# Patient Record
Sex: FEMALE | Race: OTHER | HISPANIC OR LATINO | Employment: PART TIME | URBAN - METROPOLITAN AREA
[De-identification: names, ages, dates, MRNs, and addresses within clinical notes are randomized per-mention and may not be internally consistent; named-entity substitution may affect disease eponyms.]

---

## 2022-10-04 ENCOUNTER — APPOINTMENT (OUTPATIENT)
Dept: RADIOLOGY | Facility: HOSPITAL | Age: 32
End: 2022-10-04
Payer: COMMERCIAL

## 2022-10-04 ENCOUNTER — HOSPITAL ENCOUNTER (EMERGENCY)
Facility: HOSPITAL | Age: 32
Discharge: HOME | End: 2022-10-04
Attending: EMERGENCY MEDICINE
Payer: COMMERCIAL

## 2022-10-04 VITALS
SYSTOLIC BLOOD PRESSURE: 149 MMHG | DIASTOLIC BLOOD PRESSURE: 78 MMHG | TEMPERATURE: 99.3 F | WEIGHT: 214.29 LBS | RESPIRATION RATE: 18 BRPM | OXYGEN SATURATION: 100 % | HEART RATE: 81 BPM

## 2022-10-04 DIAGNOSIS — R07.89 ATYPICAL CHEST PAIN: Primary | ICD-10-CM

## 2022-10-04 LAB
ANION GAP SERPL CALCULATED.3IONS-SCNC: 8 MMOL/L (ref 4–13)
BASOPHILS # BLD AUTO: 0.04 THOUSANDS/ΜL (ref 0–0.1)
BASOPHILS NFR BLD AUTO: 0 % (ref 0–1)
BUN SERPL-MCNC: 18 MG/DL (ref 5–25)
CALCIUM SERPL-MCNC: 9.2 MG/DL (ref 8.4–10.2)
CARDIAC TROPONIN I PNL SERPL HS: <2 NG/L
CHLORIDE SERPL-SCNC: 103 MMOL/L (ref 96–108)
CO2 SERPL-SCNC: 26 MMOL/L (ref 21–32)
CREAT SERPL-MCNC: 0.62 MG/DL (ref 0.6–1.3)
EOSINOPHIL # BLD AUTO: 0.2 THOUSAND/ΜL (ref 0–0.61)
EOSINOPHIL NFR BLD AUTO: 2 % (ref 0–6)
ERYTHROCYTE [DISTWIDTH] IN BLOOD BY AUTOMATED COUNT: 12.6 % (ref 11.6–15.1)
GFR SERPL CREATININE-BSD FRML MDRD: 119 ML/MIN/1.73SQ M
GLUCOSE SERPL-MCNC: 99 MG/DL (ref 65–140)
HCT VFR BLD AUTO: 36.1 % (ref 34.8–46.1)
HGB BLD-MCNC: 11.8 G/DL (ref 11.5–15.4)
IMM GRANULOCYTES # BLD AUTO: 0.06 THOUSAND/UL (ref 0–0.2)
IMM GRANULOCYTES NFR BLD AUTO: 1 % (ref 0–2)
LYMPHOCYTES # BLD AUTO: 2.34 THOUSANDS/ΜL (ref 0.6–4.47)
LYMPHOCYTES NFR BLD AUTO: 24 % (ref 14–44)
MCH RBC QN AUTO: 28.9 PG (ref 26.8–34.3)
MCHC RBC AUTO-ENTMCNC: 32.7 G/DL (ref 31.4–37.4)
MCV RBC AUTO: 88 FL (ref 82–98)
MONOCYTES # BLD AUTO: 0.66 THOUSAND/ΜL (ref 0.17–1.22)
MONOCYTES NFR BLD AUTO: 7 % (ref 4–12)
NEUTROPHILS # BLD AUTO: 6.44 THOUSANDS/ΜL (ref 1.85–7.62)
NEUTS SEG NFR BLD AUTO: 66 % (ref 43–75)
NRBC BLD AUTO-RTO: 0 /100 WBCS
PLATELET # BLD AUTO: 317 THOUSANDS/UL (ref 149–390)
PMV BLD AUTO: 9.3 FL (ref 8.9–12.7)
POTASSIUM SERPL-SCNC: 3.7 MMOL/L (ref 3.5–5.3)
RBC # BLD AUTO: 4.09 MILLION/UL (ref 3.81–5.12)
SODIUM SERPL-SCNC: 137 MMOL/L (ref 135–147)
WBC # BLD AUTO: 9.74 THOUSAND/UL (ref 4.31–10.16)

## 2022-10-04 PROCEDURE — 85025 COMPLETE CBC W/AUTO DIFF WBC: CPT | Performed by: EMERGENCY MEDICINE

## 2022-10-04 PROCEDURE — 93005 ELECTROCARDIOGRAM TRACING: CPT

## 2022-10-04 PROCEDURE — 84484 ASSAY OF TROPONIN QUANT: CPT | Performed by: EMERGENCY MEDICINE

## 2022-10-04 PROCEDURE — 71046 X-RAY EXAM CHEST 2 VIEWS: CPT

## 2022-10-04 PROCEDURE — 99285 EMERGENCY DEPT VISIT HI MDM: CPT

## 2022-10-04 PROCEDURE — 80048 BASIC METABOLIC PNL TOTAL CA: CPT | Performed by: EMERGENCY MEDICINE

## 2022-10-04 PROCEDURE — 99285 EMERGENCY DEPT VISIT HI MDM: CPT | Performed by: EMERGENCY MEDICINE

## 2022-10-04 PROCEDURE — 36415 COLL VENOUS BLD VENIPUNCTURE: CPT | Performed by: EMERGENCY MEDICINE

## 2022-10-04 NOTE — Clinical Note
Nanette Liu was seen and treated in our emergency department on 10/4/2022  No restrictions            Diagnosis:     Arcenio Browne  may return to work on return date  She may return on this date: 10/05/2022         If you have any questions or concerns, please don't hesitate to call        [address] [place] [address], DO    ______________________________           _______________          _______________  Hospital Representative                              Date                                Time

## 2022-10-05 LAB
ATRIAL RATE: 81 BPM
P AXIS: 64 DEGREES
PR INTERVAL: 152 MS
QRS AXIS: 74 DEGREES
QRSD INTERVAL: 82 MS
QT INTERVAL: 368 MS
QTC INTERVAL: 427 MS
T WAVE AXIS: 68 DEGREES
VENTRICULAR RATE: 81 BPM

## 2022-10-05 PROCEDURE — 93010 ELECTROCARDIOGRAM REPORT: CPT | Performed by: INTERNAL MEDICINE

## 2022-10-05 NOTE — ED PROVIDER NOTES
History  Chief Complaint   Patient presents with    Chest Pain     Pt presents to the ED for chest pain and palpitations since last evening  She reports last evening when chest pain started "I broke out in a sweat " She reports SOB with pain that "comes and goes"        History provided by:  Patient  Chest Pain  Chest pain location: Upper back, anterior chest   Pain quality: aching    Pain radiates to:  Does not radiate  Pain severity:  Moderate  Onset quality:  Gradual  Duration:  20 hours  Timing:  Intermittent  Progression:  Waxing and waning  Chronicity:  New  Context: no trauma    Relieved by:  Certain positions  Worsened by:  Certain positions  Ineffective treatments:  None tried  Associated symptoms: no abdominal pain, no cough, no diaphoresis, no dizziness, no fever, no headache, no nausea, no numbness, no palpitations, no shortness of breath and not vomiting        None       History reviewed  No pertinent past medical history  Past Surgical History:   Procedure Laterality Date     SECTION         History reviewed  No pertinent family history  I have reviewed and agree with the history as documented  E-Cigarette/Vaping     E-Cigarette/Vaping Substances     Social History     Tobacco Use    Smoking status: Never Smoker   Substance Use Topics    Alcohol use: Not Currently    Drug use: Not Currently       Review of Systems   Constitutional: Negative for activity change, chills, diaphoresis and fever  HENT: Negative for congestion, sinus pressure and sore throat  Eyes: Negative for pain and visual disturbance  Respiratory: Negative for cough, chest tightness, shortness of breath, wheezing and stridor  Cardiovascular: Positive for chest pain  Negative for palpitations  Gastrointestinal: Negative for abdominal distention, abdominal pain, constipation, diarrhea, nausea and vomiting  Genitourinary: Negative for dysuria and frequency     Musculoskeletal: Negative for neck pain and neck stiffness  Skin: Negative for rash  Neurological: Negative for dizziness, speech difficulty, light-headedness, numbness and headaches  Physical Exam  Physical Exam  Vitals reviewed  Constitutional:       General: She is not in acute distress  Appearance: She is well-developed  She is not diaphoretic  HENT:      Head: Normocephalic and atraumatic  Right Ear: External ear normal       Left Ear: External ear normal       Nose: Nose normal    Eyes:      General:         Right eye: No discharge  Left eye: No discharge  Pupils: Pupils are equal, round, and reactive to light  Neck:      Trachea: No tracheal deviation  Cardiovascular:      Rate and Rhythm: Normal rate and regular rhythm  Heart sounds: Normal heart sounds  No murmur heard  Pulmonary:      Effort: Pulmonary effort is normal  No respiratory distress  Breath sounds: Normal breath sounds  No stridor  Chest:      Chest wall: Tenderness ( Left upper back, patient with truncal rotation or palpation of the left paraspinal musculature fully reproduced pain) present  Abdominal:      General: There is no distension  Palpations: Abdomen is soft  Tenderness: There is no abdominal tenderness  There is no guarding or rebound  Musculoskeletal:         General: Normal range of motion  Cervical back: Normal range of motion and neck supple  Skin:     General: Skin is warm and dry  Coloration: Skin is not pale  Findings: No erythema  Neurological:      General: No focal deficit present  Mental Status: She is alert and oriented to person, place, and time           Vital Signs  ED Triage Vitals [10/04/22 2028]   Temperature Pulse Respirations Blood Pressure SpO2   99 3 °F (37 4 °C) 81 18 149/78 100 %      Temp Source Heart Rate Source Patient Position - Orthostatic VS BP Location FiO2 (%)   Oral -- Sitting Right arm --      Pain Score       6           Vitals:    10/04/22 2028   BP: 149/78   Pulse: 81   Patient Position - Orthostatic VS: Sitting         Visual Acuity      ED Medications  Medications - No data to display    Diagnostic Studies  Results Reviewed     Procedure Component Value Units Date/Time    HS Troponin 0hr (reflex protocol) [[id number]]  (Normal) Collected: 10/04/22 2052    Lab Status: Final result Specimen: Blood from Arm, Right Updated: 10/04/22 2139     hs TnI 0hr <2 ng/L     Basic metabolic panel [[id number]] Collected: 10/04/22 2052    Lab Status: Final result Specimen: Blood from Arm, Right Updated: 10/04/22 2132     Sodium 137 mmol/L      Potassium 3 7 mmol/L      Chloride 103 mmol/L      CO2 26 mmol/L      ANION GAP 8 mmol/L      BUN 18 mg/dL      Creatinine 0 62 mg/dL      Glucose 99 mg/dL      Calcium 9 2 mg/dL      eGFR 119 ml/min/1 73sq m     Narrative:      Joni guidelines for Chronic Kidney Disease (CKD):     Stage 1 with normal or high GFR (GFR > 90 mL/min/1 73 square meters)    Stage 2 Mild CKD (GFR = 60-89 mL/min/1 73 square meters)    Stage 3A Moderate CKD (GFR = 45-59 mL/min/1 73 square meters)    Stage 3B Moderate CKD (GFR = 30-44 mL/min/1 73 square meters)    Stage 4 Severe CKD (GFR = 15-29 mL/min/1 73 square meters)    Stage 5 End Stage CKD (GFR <15 mL/min/1 73 square meters)  Note: GFR calculation is accurate only with a steady state creatinine    CBC and differential [[id number]] Collected: 10/04/22 2052    Lab Status: Final result Specimen: Blood from Arm, Right Updated: 10/04/22 2057     WBC 9 74 Thousand/uL      RBC 4 09 Million/uL      Hemoglobin 11 8 g/dL      Hematocrit 36 1 %      MCV 88 fL      MCH 28 9 pg      MCHC 32 7 g/dL      RDW 12 6 %      MPV 9 3 fL      Platelets 735 Thousands/uL      nRBC 0 /100 WBCs      Neutrophils Relative 66 %      Immat GRANS % 1 %      Lymphocytes Relative 24 %      Monocytes Relative 7 %      Eosinophils Relative 2 %      Basophils Relative 0 %      Neutrophils Absolute 6 44 Thousands/µL      Immature Grans Absolute 0 06 Thousand/uL      Lymphocytes Absolute 2 34 Thousands/µL      Monocytes Absolute 0 66 Thousand/µL      Eosinophils Absolute 0 20 Thousand/µL      Basophils Absolute 0 04 Thousands/µL                  XR chest 2 views   ED Interpretation by Ovi Bird DO (10/04 2138)   No acute pathology                 Procedures  ECG 12 Lead Documentation Only    Date/Time: 10/4/2022 8:33 PM  Performed by: Ovi Bird DO  Authorized by: Ovi Bird DO     ECG reviewed by me, the ED Provider: yes    Patient location:  ED  Interpretation:     Interpretation: normal    Rate:     ECG rate:  81    ECG rate assessment: normal    Rhythm:     Rhythm: sinus rhythm    Ectopy:     Ectopy: none    QRS:     QRS axis:  Normal    QRS intervals:  Normal  Conduction:     Conduction: normal    ST segments:     ST segments:  Normal  T waves:     T waves: normal               ED Course                       PERC Rule for PE    Flowsheet Row Most Recent Value   PERC Rule for PE    Age >=50 0 Filed at: 10/04/2022 2047   HR >=100 0 Filed at: 10/04/2022 2047   O2 Sat on room air < 95% 0 Filed at: 10/04/2022 2047   History of PE or DVT 0 Filed at: 10/04/2022 2047   Recent trauma or surgery 0 Filed at: 10/04/2022 2047   Hemoptysis 0 Filed at: 10/04/2022 2047   Exogenous estrogen 0 Filed at: 10/04/2022 2047   Unilateral leg swelling 0 Filed at: 10/04/2022 2047   PERC Rule for PE Results 0 Filed at: 10/04/2022 2047                            MDM  Number of Diagnoses or Management Options  Atypical chest pain: new and requires workup  Diagnosis management comments:       Initial ED assessment:  35-year-old female presents with left upper back pain and anterior chest pain  , reproducible on examination    Initial DDx includes but is not limited to:   Musculoskeletal pain, pneumothorax, pulmonary pathology less likely cardiac etiology, patient PERC rule negative making pulmonary embolism unlikely    Initial ED plan:   Blood work, chest x-ray, if unremarkable will DC        Final ED summary/disposition:   After evaluation and workup in the emergency department, workup unremarkable, will discharge        Amount and/or Complexity of Data Reviewed  Clinical lab tests: ordered and reviewed  Tests in the radiology section of CPT®: ordered and reviewed  Review and summarize past medical records: yes  Independent visualization of images, tracings, or specimens: yes        Disposition  Final diagnoses:   Atypical chest pain     Time reflects when diagnosis was documented in both MDM as applicable and the Disposition within this note     Time User Action Codes Description Comment    10/4/2022  9:42 PM Brenda Laureano Add [R07 89] Atypical chest pain       ED Disposition     ED Disposition   Discharge    Condition   Stable    Date/Time   Tue Oct 4, 2022  9:42 PM    Comment   Fernando Ghotra discharge to home/self care  Follow-up Information     Follow up With Specialties Details Why Contact Info    Cristian Roberson MD  Call in 1 day To arrange for the next available appointment [address] [facility]  [phone]            Patient's Medications    No medications on file       No discharge procedures on file      PDMP Review     None          ED Provider  Electronically Signed by           Ovi Bird DO  10/04/22 2144

## 2022-11-09 ENCOUNTER — HOSPITAL ENCOUNTER (EMERGENCY)
Facility: HOSPITAL | Age: 32
Discharge: HOME | End: 2022-11-09
Attending: EMERGENCY MEDICINE

## 2022-11-09 VITALS
SYSTOLIC BLOOD PRESSURE: 134 MMHG | RESPIRATION RATE: 18 BRPM | DIASTOLIC BLOOD PRESSURE: 85 MMHG | OXYGEN SATURATION: 98 % | HEART RATE: 88 BPM

## 2022-11-09 DIAGNOSIS — Z77.21 EXPOSURE TO BLOOD OR BODY FLUID: Primary | ICD-10-CM

## 2022-11-09 LAB
ALT SERPL W P-5'-P-CCNC: 38 U/L (ref 12–78)
HBV SURFACE AB SER-ACNC: 166.12 MIU/ML
HBV SURFACE AG SER QL: NORMAL
HCV AB SER QL: NORMAL
HIV 1+2 AB+HIV1 P24 AG SERPL QL IA: NORMAL

## 2022-11-09 NOTE — ED ATTENDING ATTESTATION
11/9/2022  IHoney MD, saw and evaluated the patient  I have discussed the patient with the resident/non-physician practitioner and agree with the resident's/non-physician practitioner's findings, Plan of Care, and MDM as documented in the resident's/non-physician practitioner's note, except where noted  All available labs and Radiology studies were reviewed  I was present for key portions of any procedure(s) performed by the resident/non-physician practitioner and I was immediately available to provide assistance  At this point I agree with the current assessment done in the Emergency Department  I have conducted an independent evaluation of this patient a history and physical is as follows:    ED Course     Emergency Department Note- Fior Jurado 28 y o  female MRN: [id number]    Unit/Bed#: ED 06 Encounter: [id number]    Fior Jurado is a 28 y o  female who presents with   Chief Complaint   Patient presents with   • Body Fluid Exposure     Pt was splashed in face with sputum of another pt         History of Present Illness   HPI:  Fior Jurado is a 28 y o  female who presents for evaluation of:  Exposure to bodily fluids  Patient was working with a PICU patient with a tracheostomy; some sputum from the tracheostomy splashed and hit her in the eye  Patient immediately washed the area off  Patient denies any eye discomfort  Review of Systems   Constitutional: Negative for fatigue and fever  HENT: Negative for congestion and sore throat  Respiratory: Negative for cough and shortness of breath  Cardiovascular: Negative for chest pain and palpitations  Gastrointestinal: Negative for abdominal pain and nausea  Genitourinary: Negative for flank pain and frequency  Neurological: Negative for light-headedness and headaches  Psychiatric/Behavioral: Negative for dysphoric mood and hallucinations  All other systems reviewed and are negative        Historical Information   History reviewed  No pertinent past medical history  Past Surgical History:   Procedure Laterality Date   •  SECTION       Social History   Social History     Substance and Sexual Activity   Alcohol Use Not Currently     Social History     Substance and Sexual Activity   Drug Use Not Currently     Social History     Tobacco Use   Smoking Status Never Smoker   Smokeless Tobacco Not on file     Family History: History reviewed  No pertinent family history  Meds/Allergies   PTA meds:   None     No Known Allergies    Objective   First Vitals:   Blood Pressure: 134/85 (22 0034)  Pulse: 88 (22 003)  Respirations: 18 (22)  SpO2: 98 % (22)    Current Vitals:   Blood Pressure: 134/85 (22)  Pulse: 88 (22)  Respirations: 18 (22)  SpO2: 98 % (22 004)    No intake or output data in the 24 hours ending 22 0417    Invasive Devices  Report    None                 Physical Exam  Vitals and nursing note reviewed  Constitutional:       General: She is not in acute distress  Appearance: Normal appearance  She is well-developed  HENT:      Head: Normocephalic and atraumatic  Right Ear: External ear normal       Left Ear: External ear normal       Nose: Nose normal       Mouth/Throat:      Pharynx: No oropharyngeal exudate  Eyes:      Conjunctiva/sclera: Conjunctivae normal       Pupils: Pupils are equal, round, and reactive to light  Cardiovascular:      Rate and Rhythm: Normal rate and regular rhythm  Pulmonary:      Effort: Pulmonary effort is normal  No respiratory distress  Abdominal:      General: Abdomen is flat  There is no distension  Palpations: Abdomen is soft  Musculoskeletal:         General: No deformity  Normal range of motion  Cervical back: Normal range of motion and neck supple  Skin:     General: Skin is warm and dry  Capillary Refill: Capillary refill takes less than 2 seconds     Neurological: General: No focal deficit present  Mental Status: She is alert and oriented to person, place, and time  Mental status is at baseline  Coordination: Coordination normal    Psychiatric:         Mood and Affect: Mood normal          Behavior: Behavior normal          Thought Content: Thought content normal          Judgment: Judgment normal            Medical Decision Makin  Significant bodily fluid exposure:  Patient and source patient screened with exposure panel  Patient will follow-up with employee health  Recent Results (from the past 36 hour(s))   ALT    Collection Time: 22  1:17 AM   Result Value Ref Range    ALT 38 12 - 78 U/L     No orders to display         Portions of the record may have been created with voice recognition software  Occasional wrong word or "sound a like" substitutions may have occurred due to the inherent limitations of voice recognition software  Read the chart carefully and recognize, using context, where substitutions have occurred          Critical Care Time  Procedures

## 2022-11-09 NOTE — ED PROVIDER NOTES
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  Patient states that some of the sputum from the tracheostomy splashed and hit her in the eyes  States she was wearing a mask at the time  Patient immediately washed the area off  She has no complaints at this time  None       History reviewed  No pertinent past medical history  Past Surgical History:   Procedure Laterality Date   •  SECTION         History reviewed  No pertinent family history  I have reviewed and agree with the history as documented  E-Cigarette/Vaping     E-Cigarette/Vaping Substances     Social History     Tobacco Use   • Smoking status: Never Smoker   Substance Use Topics   • Alcohol use: Not Currently   • Drug use: Not Currently        Review of Systems   Constitutional: Negative for chills and fever  HENT: Negative for ear pain and sore throat  Eyes: Negative for pain and visual disturbance  Respiratory: Negative for cough and shortness of breath  Cardiovascular: Negative for chest pain and palpitations  Gastrointestinal: Negative for abdominal pain and vomiting  Genitourinary: Negative for dysuria and hematuria  Musculoskeletal: Negative for arthralgias and back pain  Skin: Negative for color change and rash  Neurological: Negative for seizures and syncope  All other systems reviewed and are negative        Physical Exam  ED Triage Vitals [22 0034]   Temp Pulse Respirations Blood Pressure SpO2   -- 88 18 134/85 98 %      Temp src Heart Rate Source Patient Position - Orthostatic VS BP Location FiO2 (%)   -- Monitor Lying Right arm --      Pain Score       No Pain             Orthostatic Vital Signs  Vitals: 11/09/22 0034   BP: 134/85   Pulse: 88   Patient Position - Orthostatic VS: Lying       Physical Exam  Vitals and nursing note reviewed  Constitutional:       General: She is not in acute distress  Appearance: Normal appearance  She is well-developed  She is not ill-appearing, toxic-appearing or diaphoretic  HENT:      Head: Normocephalic and atraumatic  Eyes:      General: No scleral icterus  Right eye: No discharge  Left eye: No discharge  Extraocular Movements: Extraocular movements intact  Conjunctiva/sclera: Conjunctivae normal       Pupils: Pupils are equal, round, and reactive to light  Cardiovascular:      Rate and Rhythm: Normal rate  Heart sounds: No murmur heard  Pulmonary:      Effort: Pulmonary effort is normal  No respiratory distress  Musculoskeletal:      Cervical back: Neck supple  Skin:     General: Skin is warm and dry  Neurological:      General: No focal deficit present  Mental Status: She is alert and oriented to person, place, and time  ED Medications  Medications - No data to display    Diagnostic Studies  Results Reviewed     Procedure Component Value Units Date/Time    ALT [[id number]]  (Normal) Collected: 11/09/22 0117    Lab Status: Final result Specimen: Blood from Arm, Right Updated: 11/09/22 0224     ALT 38 U/L     Hepatitis B surface antigen [[id number]] Collected: 11/09/22 0117    Lab Status: In process Specimen: Blood from Arm, Right Updated: 11/09/22 0127    Hepatitis C antibody [[id number]] Collected: 11/09/22 0117    Lab Status: In process Specimen: Blood from Arm, Right Updated: 11/09/22 0127    Hepatitis B surface antibody [[id number]] Collected: 11/09/22 0117    Lab Status: In process Specimen: Blood from Arm, Right Updated: 11/09/22 0127    HIV 1/2 Antigen/Antibody (4th Generation) w Aurelio Shaw [[id number]] Collected: 11/09/22 0117    Lab Status:  In process Specimen: Blood from Arm, Right Updated: 11/09/22 0127 No orders to display         Procedures  Procedures      ED Course  ED Course as of 11/09/22 0529   Wed Nov 09, 2022   0037 Blood Pressure: 134/85   0037 Pulse: 88   0037 Respirations: 18   0037 SpO2: 98 %                                       MDM  Number of Diagnoses or Management Options    49-year-old female presents to the ED after bodily fluid exposure and is here requesting for exposure panel to be obtained  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; states some of the sputum from the tracheostomy splashed and hit her in the eyes  Has no complaints at this time  Vitals stable  Exposure panel collected  Contacted provider of source patient to obtain exposure panel from source patient  Completed exposure for and provided a copy for the patient  Instructed patient to follow-up with employee health  Stable for discharge  Disposition  Final diagnoses:   Exposure to blood or body fluid     Time reflects when diagnosis was documented in both MDM as applicable and the Disposition within this note     Time User Action Codes Description Comment    11/9/2022  1:03 AM Shahriar GALLEGO Add [Z77 21] Exposure to blood or body fluid       ED Disposition     ED Disposition   Discharge    Condition   Stable    Date/Time   Wed Nov 9, 2022  1:03 AM    Comment   Bob Arizmendi discharge to home/self care  Follow-up Information     Follow up With Specialties Details Why Contact Info    Ramses Jaramillo MD    [address]  [phone]          There are no discharge medications for this patient  No discharge procedures on file  PDMP Review     None           ED Provider  Attending physically available and evaluated Bob Arizmendi I managed the patient along with the ED Attending      Electronically Signed by         Theresa Miguel MD  11/09/22 9672

## 2023-01-13 ENCOUNTER — APPOINTMENT (OUTPATIENT)
Dept: LAB | Facility: CLINIC | Age: 33
End: 2023-01-13

## 2023-01-13 DIAGNOSIS — Z02.1 PRE-EMPLOYMENT HEALTH SCREENING EXAMINATION: ICD-10-CM

## 2023-01-13 LAB
MEV IGG SER QL IA: NORMAL
MUV IGG SER QL IA: NORMAL
RUBV IGG SERPL IA-ACNC: 23.7 IU/ML
VZV IGG SER QL IA: NORMAL

## 2023-01-16 LAB
GAMMA INTERFERON BACKGROUND BLD IA-ACNC: 0.03 IU/ML
M TB IFN-G BLD-IMP: NEGATIVE
M TB IFN-G CD4+ BCKGRND COR BLD-ACNC: -0.01 IU/ML
M TB IFN-G CD4+ BCKGRND COR BLD-ACNC: -0.01 IU/ML
MITOGEN IGNF BCKGRD COR BLD-ACNC: >10 IU/ML

## 2023-04-06 ENCOUNTER — APPOINTMENT (EMERGENCY)
Dept: RADIOLOGY | Facility: HOSPITAL | Age: 33
End: 2023-04-06

## 2023-04-06 ENCOUNTER — HOSPITAL ENCOUNTER (EMERGENCY)
Facility: HOSPITAL | Age: 33
Discharge: HOME | End: 2023-04-06
Attending: EMERGENCY MEDICINE

## 2023-04-06 VITALS
DIASTOLIC BLOOD PRESSURE: 91 MMHG | HEIGHT: 62 IN | RESPIRATION RATE: 20 BRPM | WEIGHT: 216 LBS | SYSTOLIC BLOOD PRESSURE: 152 MMHG | HEART RATE: 78 BPM | BODY MASS INDEX: 39.75 KG/M2 | OXYGEN SATURATION: 99 % | TEMPERATURE: 98.4 F

## 2023-04-06 DIAGNOSIS — M54.9 MUSCULOSKELETAL BACK PAIN: ICD-10-CM

## 2023-04-06 DIAGNOSIS — R07.89 ATYPICAL CHEST PAIN: Primary | ICD-10-CM

## 2023-04-06 LAB
ALBUMIN SERPL BCP-MCNC: 4.3 G/DL (ref 3.5–5)
ALP SERPL-CCNC: 52 U/L (ref 34–104)
ALT SERPL W P-5'-P-CCNC: 18 U/L (ref 7–52)
ANION GAP SERPL CALCULATED.3IONS-SCNC: 8 MMOL/L (ref 4–13)
AST SERPL W P-5'-P-CCNC: 14 U/L (ref 13–39)
BASOPHILS # BLD AUTO: 0.05 THOUSANDS/ÂΜL (ref 0–0.1)
BASOPHILS NFR BLD AUTO: 0 % (ref 0–1)
BILIRUB SERPL-MCNC: 0.49 MG/DL (ref 0.2–1)
BUN SERPL-MCNC: 15 MG/DL (ref 5–25)
CALCIUM SERPL-MCNC: 9.2 MG/DL (ref 8.4–10.2)
CARDIAC TROPONIN I PNL SERPL HS: 2 NG/L
CHLORIDE SERPL-SCNC: 102 MMOL/L (ref 96–108)
CO2 SERPL-SCNC: 26 MMOL/L (ref 21–32)
CREAT SERPL-MCNC: 0.61 MG/DL (ref 0.6–1.3)
D DIMER PPP FEU-MCNC: <0.27 UG/ML FEU
EOSINOPHIL # BLD AUTO: 0.17 THOUSAND/ÂΜL (ref 0–0.61)
EOSINOPHIL NFR BLD AUTO: 1 % (ref 0–6)
ERYTHROCYTE [DISTWIDTH] IN BLOOD BY AUTOMATED COUNT: 12.5 % (ref 11.6–15.1)
EXT PREGNANCY TEST URINE: NEGATIVE
EXT. CONTROL: NORMAL
GFR SERPL CREATININE-BSD FRML MDRD: 119 ML/MIN/1.73SQ M
GLUCOSE SERPL-MCNC: 118 MG/DL (ref 65–140)
HCT VFR BLD AUTO: 36.9 % (ref 34.8–46.1)
HGB BLD-MCNC: 12.2 G/DL (ref 11.5–15.4)
IMM GRANULOCYTES # BLD AUTO: 0.04 THOUSAND/UL (ref 0–0.2)
IMM GRANULOCYTES NFR BLD AUTO: 0 % (ref 0–2)
LYMPHOCYTES # BLD AUTO: 2.2 THOUSANDS/ÂΜL (ref 0.6–4.47)
LYMPHOCYTES NFR BLD AUTO: 19 % (ref 14–44)
MCH RBC QN AUTO: 29 PG (ref 26.8–34.3)
MCHC RBC AUTO-ENTMCNC: 33.1 G/DL (ref 31.4–37.4)
MCV RBC AUTO: 88 FL (ref 82–98)
MONOCYTES # BLD AUTO: 0.64 THOUSAND/ÂΜL (ref 0.17–1.22)
MONOCYTES NFR BLD AUTO: 6 % (ref 4–12)
NEUTROPHILS # BLD AUTO: 8.64 THOUSANDS/ÂΜL (ref 1.85–7.62)
NEUTS SEG NFR BLD AUTO: 74 % (ref 43–75)
NRBC BLD AUTO-RTO: 0 /100 WBCS
PLATELET # BLD AUTO: 318 THOUSANDS/UL (ref 149–390)
PMV BLD AUTO: 9.7 FL (ref 8.9–12.7)
POTASSIUM SERPL-SCNC: 3.7 MMOL/L (ref 3.5–5.3)
PROT SERPL-MCNC: 7.8 G/DL (ref 6.4–8.4)
RBC # BLD AUTO: 4.2 MILLION/UL (ref 3.81–5.12)
SODIUM SERPL-SCNC: 136 MMOL/L (ref 135–147)
WBC # BLD AUTO: 11.74 THOUSAND/UL (ref 4.31–10.16)

## 2023-04-06 RX ORDER — KETOROLAC TROMETHAMINE 30 MG/ML
15 INJECTION, SOLUTION INTRAMUSCULAR; INTRAVENOUS ONCE
Status: COMPLETED | OUTPATIENT
Start: 2023-04-06 | End: 2023-04-06

## 2023-04-06 RX ORDER — LIDOCAINE 50 MG/G
1 PATCH TOPICAL ONCE
Status: DISCONTINUED | OUTPATIENT
Start: 2023-04-06 | End: 2023-04-07 | Stop reason: HOSPADM

## 2023-04-06 RX ORDER — LIDOCAINE 50 MG/G
1 PATCH TOPICAL DAILY
Qty: 5 PATCH | Refills: 0 | Status: SHIPPED | OUTPATIENT
Start: 2023-04-06

## 2023-04-06 RX ADMIN — LIDOCAINE 1 PATCH: 50 PATCH CUTANEOUS at 21:31

## 2023-04-06 RX ADMIN — KETOROLAC TROMETHAMINE 15 MG: 30 INJECTION, SOLUTION INTRAMUSCULAR; INTRAVENOUS at 21:37

## 2023-04-07 LAB
ATRIAL RATE: 73 BPM
P AXIS: 43 DEGREES
PR INTERVAL: 140 MS
QRS AXIS: 80 DEGREES
QRSD INTERVAL: 86 MS
QT INTERVAL: 390 MS
QTC INTERVAL: 429 MS
T WAVE AXIS: 68 DEGREES
VENTRICULAR RATE: 73 BPM

## 2023-04-07 NOTE — ED PROVIDER NOTES
"History  Chief Complaint   Patient presents with   • Chest Pain     Pt c/o left sided chest tightness that radiates to her left back, pt states \"it just keeps squeezing and squeezing me\", pt reports pain increases with deep breathing     Patient is a 75-year-old female past medical history of  presenting to the emergency department for evaluation of chest pain  Patient reports she has had pain in her left scapula for the past 2 weeks  Patient reports she did follow-up with her PCP and was given prednisone  Reports for the past few days she began having left-sided chest tightness  Patient reports it feels as though there is a muscle being pulled in her chest that comes and goes  Patient reports it is worse when she tries to lay flat  Patient denies any alleviation of the pain and reports it goes away on its own  Patient denies pain on initial presentation  Patient reports having to take a deep breath when the pain comes on  In contrast with triage note patient denies any worsening pain with deep inspiration  Denies fevers, chills, rash, headache, weakness, dizziness, visual changes, abdominal pain, nausea, vomiting, diarrhea, constipation, shortness of breath or difficulty breathing  Does not offer any other concerns or complaints  None       History reviewed  No pertinent past medical history  Past Surgical History:   Procedure Laterality Date   •  SECTION         History reviewed  No pertinent family history  I have reviewed and agree with the history as documented  E-Cigarette/Vaping     E-Cigarette/Vaping Substances     Social History     Tobacco Use   • Smoking status: Never   Substance Use Topics   • Alcohol use: Not Currently   • Drug use: Not Currently       Review of Systems   Constitutional: Negative for chills and fever  HENT: Negative for ear pain and sore throat  Eyes: Negative for pain and visual disturbance  Respiratory: Positive for chest tightness   " Negative for cough and shortness of breath  Cardiovascular: Positive for chest pain  Negative for palpitations  Gastrointestinal: Negative for abdominal pain, constipation, diarrhea, nausea and vomiting  Genitourinary: Negative for dysuria and hematuria  Musculoskeletal: Negative for arthralgias and back pain  Left scapular pain   Skin: Negative for color change and rash  Neurological: Negative for dizziness, seizures, syncope, weakness, light-headedness and headaches  All other systems reviewed and are negative  Physical Exam  Physical Exam  Vitals and nursing note reviewed  Constitutional:       General: She is not in acute distress  Appearance: Normal appearance  She is well-developed  She is not ill-appearing, toxic-appearing or diaphoretic  HENT:      Head: Normocephalic and atraumatic  Right Ear: External ear normal       Left Ear: External ear normal       Nose: Nose normal       Mouth/Throat:      Mouth: Mucous membranes are moist    Eyes:      General: No scleral icterus  Right eye: No discharge  Left eye: No discharge  Conjunctiva/sclera: Conjunctivae normal    Cardiovascular:      Rate and Rhythm: Normal rate and regular rhythm  Heart sounds: No murmur heard  Pulmonary:      Effort: Pulmonary effort is normal  No respiratory distress  Breath sounds: Normal breath sounds  No decreased breath sounds, wheezing, rhonchi or rales  Abdominal:      Palpations: Abdomen is soft  Tenderness: There is no abdominal tenderness  Musculoskeletal:         General: No swelling, deformity or signs of injury  Normal range of motion  Arms:       Cervical back: Normal range of motion and neck supple  No rigidity  Skin:     General: Skin is warm and dry  Capillary Refill: Capillary refill takes less than 2 seconds  Coloration: Skin is not jaundiced  Findings: No erythema or rash     Neurological:      General: No focal deficit present  Mental Status: She is alert and oriented to person, place, and time  Mental status is at baseline  Cranial Nerves: No cranial nerve deficit  Gait: Gait normal    Psychiatric:         Mood and Affect: Mood normal          Behavior: Behavior normal          Thought Content:  Thought content normal          Judgment: Judgment normal          Vital Signs  ED Triage Vitals [04/06/23 2106]   Temperature Pulse Respirations Blood Pressure SpO2   98 4 °F (36 9 °C) 78 20 152/91 99 %      Temp Source Heart Rate Source Patient Position - Orthostatic VS BP Location FiO2 (%)   Oral Monitor Sitting Left arm --      Pain Score       --           Vitals:    04/06/23 2106   BP: 152/91   Pulse: 78   Patient Position - Orthostatic VS: Sitting         Visual Acuity      ED Medications  Medications   lidocaine (LIDODERM) 5 % patch 1 patch (1 patch Topical Medication Applied 4/6/23 2131)   ketorolac (TORADOL) injection 15 mg (15 mg Intravenous Given 4/6/23 2137)       Diagnostic Studies  Results Reviewed     Procedure Component Value Units Date/Time    HS Troponin 0hr (reflex protocol) [[id number]]  (Normal) Collected: 04/06/23 2131    Lab Status: Final result Specimen: Blood from Arm, Left Updated: 04/06/23 2157     hs TnI 0hr 2 ng/L     Comprehensive metabolic panel [[id number]] Collected: 04/06/23 2131    Lab Status: Final result Specimen: Blood from Arm, Left Updated: 04/06/23 2154     Sodium 136 mmol/L      Potassium 3 7 mmol/L      Chloride 102 mmol/L      CO2 26 mmol/L      ANION GAP 8 mmol/L      BUN 15 mg/dL      Creatinine 0 61 mg/dL      Glucose 118 mg/dL      Calcium 9 2 mg/dL      AST 14 U/L      ALT 18 U/L      Alkaline Phosphatase 52 U/L      Total Protein 7 8 g/dL      Albumin 4 3 g/dL      Total Bilirubin 0 49 mg/dL      eGFR 119 ml/min/1 73sq m     Narrative:      [place] guidelines for Chronic Kidney Disease (CKD):   •  Stage 1 with normal or high GFR (GFR > 90 mL/min/1 73 square meters)  •  Stage 2 Mild CKD (GFR = 60-89 mL/min/1 73 square meters)  •  Stage 3A Moderate CKD (GFR = 45-59 mL/min/1 73 square meters)  •  Stage 3B Moderate CKD (GFR = 30-44 mL/min/1 73 square meters)  •  Stage 4 Severe CKD (GFR = 15-29 mL/min/1 73 square meters)  •  Stage 5 End Stage CKD (GFR <15 mL/min/1 73 square meters)  Note: GFR calculation is accurate only with a steady state creatinine    D-Dimer [[id number]]  (Normal) Collected: 04/06/23 2131    Lab Status: Final result Specimen: Blood from Arm, Left Updated: 04/06/23 2151     D-Dimer, Quant <0 27 ug/ml FEU     CBC and differential [[id number]]  (Abnormal) Collected: 04/06/23 2131    Lab Status: Final result Specimen: Blood from Arm, Left Updated: 04/06/23 2138     WBC 11 74 Thousand/uL      RBC 4 20 Million/uL      Hemoglobin 12 2 g/dL      Hematocrit 36 9 %      MCV 88 fL      MCH 29 0 pg      MCHC 33 1 g/dL      RDW 12 5 %      MPV 9 7 fL      Platelets 471 Thousands/uL      nRBC 0 /100 WBCs      Neutrophils Relative 74 %      Immat GRANS % 0 %      Lymphocytes Relative 19 %      Monocytes Relative 6 %      Eosinophils Relative 1 %      Basophils Relative 0 %      Neutrophils Absolute 8 64 Thousands/µL      Immature Grans Absolute 0 04 Thousand/uL      Lymphocytes Absolute 2 20 Thousands/µL      Monocytes Absolute 0 64 Thousand/µL      Eosinophils Absolute 0 17 Thousand/µL      Basophils Absolute 0 05 Thousands/µL     POCT pregnancy, urine [[id number]]  (Normal) Resulted: 04/06/23 2135    Lab Status: Final result Updated: 04/06/23 2135     EXT Preg Test, Ur Negative     Control Valid                 XR chest 2 views    (Results Pending)              Procedures  ECG 12 Lead Documentation Only    Date/Time: 4/6/2023 9:15 PM  Performed by: Justino Carver PA-C  Authorized by: Justino Carver PA-C     Indications / Diagnosis:  Chest Pain  ECG reviewed by me, the ED Provider: yes    Patient location:  ED  Previous ECG:     Previous ECG: Compared to current    Comparison ECG info:  10/04/22  Interpretation:     Interpretation: normal    Rate:     ECG rate:  73    ECG rate assessment: normal    Rhythm:     Rhythm: sinus rhythm    Ectopy:     Ectopy: none    QRS:     QRS axis:  Normal    QRS intervals:  Normal  Conduction:     Conduction: normal    ST segments:     ST segments:  Normal  T waves:     T waves: normal               ED Course             HEART Risk Score    Flowsheet Row Most Recent Value   Heart Score Risk Calculator    History 0 Filed at: 04/06/2023 2110   ECG 0 Filed at: 04/06/2023 2110   Age 0 Filed at: 04/06/2023 2110   Risk Factors 1 Filed at: 04/06/2023 2110   Troponin 0 Filed at: 04/06/2023 2110   HEART Score 1 Filed at: 04/06/2023 2110                        SBIRT 20yo+    Flowsheet Row Most Recent Value   SBIRT (23 yo +)    In order to provide better care to our patients, we are screening all of our patients for alcohol and drug use  Would it be okay to ask you these screening questions? Yes Filed at: 04/06/2023 2110   Initial Alcohol Screen: US AUDIT-C     1  How often do you have a drink containing alcohol? 1 Filed at: 04/06/2023 2110   2  How many drinks containing alcohol do you have on a typical day you are drinking? 1 Filed at: 04/06/2023 2110   3b  FEMALE Any Age, or MALE 65+: How often do you have 4 or more drinks on one occassion? 0 Filed at: 04/06/2023 2110   Audit-C Score 2 Filed at: 04/06/2023 2110   LISSET: How many times in the past year have you    Used an illegal drug or used a prescription medication for non-medical reasons? Never Filed at: 04/06/2023 2110                    Medical Decision Making    This is a 29-year-old female presenting to the emergency department for evaluation of chest pain  Patient reports for the past few days she has had left-sided chest pain that feels like a tight muscle  Patient reports for the past 2 weeks she has had left scapular pain that comes and goes    Patient reports she was seen by her PCP and given prednisone  Patient denies any relief with the prednisone  Patient reports the pain is worse when lying flat which has resorted her to laying sitting up  Patient denies any other aggravating or alleviating factors  Patient reports she tried Tylenol Motrin at home with slight relief but the pain comes back  Patient is well-appearing with stable vital signs on initial examination  Differential diagnosis to include but is not limited to: ACS, STEMI, Pneumothorax, Pneumonia, Pleural effusion, Pericarditis, Pericardial effusion, Chest wall pain/costochondritis, Esophageal spasm, Pulmonary embolism, Bronchitis/bronchospasm, muscular strain    Initial ED Plan: CBC, CMP, troponin, D-dimer, chest x-ray, EKG    ED results: Xray images chest independently visualized and interpreted by me - no acute cardiopulmonary disease  0 hour troponin: 2  Heart score: 1    Final ED assessment: Patient is stable and well appearing  Discussed radiologic studies and laboratory results  Discussed follow-up with PCP  Discussed Lidoderm patches as needed along with Tylenol/Motrin  Strict return precautions were discussed including but not limited to chest pain, shortness of breath, difficulty breathing, worsening pain  Patient verbalized understanding and is agreeable with the plan for discharge  Amount and/or Complexity of Data Reviewed  Labs: ordered  Radiology: ordered  Risk  Prescription drug management            Disposition  Final diagnoses:   Atypical chest pain   Musculoskeletal back pain     Time reflects when diagnosis was documented in both MDM as applicable and the Disposition within this note     Time User Action Codes Description Comment    4/6/2023 10:50 PM Yeison Marquez Add [R07 89] Atypical chest pain     4/6/2023 10:50 PM Yeison Marquez Add [M54 9] Musculoskeletal back pain       ED Disposition     ED Disposition   Discharge    Condition   Stable    Date/Time   Thu Apr 6, 2023 10:50 PM    Comment   Sarwat Foley discharge to home/self care  Follow-up Information     Follow up With Specialties Details Why Contact Info Additional Information    Sheyla Velasco MD  Call in 3 days For follow up [address]        [address] [facility] Emergency Department Emergency Medicine Go to  If symptoms worsen [address] Emergency Department, [address] [place] [address], [address] [place] [address] [facility] Cardiology Call in 3 days For follow up [place] [id number]  [address] [facility], [place], [facility], [place], [address] [phone]          Patient's Medications   Discharge Prescriptions    LIDOCAINE (LIDODERM) 5 %    Apply 1 patch topically over 12 hours daily Remove & Discard patch within 12 hours or as directed by MD       Start Date: 4/6/2023  End Date: --       Order Dose: 1 patch       Quantity: 5 patch    Refills: 0       No discharge procedures on file      PDMP Review     None          ED Provider  Electronically Signed by           Tiffany Schaefer PA-C  04/06/23 8567

## 2023-04-07 NOTE — DISCHARGE INSTRUCTIONS
Follow up with PCP  Tylenol/motrin as needed  Lidoderm patches as needed  Return to the ED with new or worsening symptoms including but not limited to chest pain, shortness of breath, difficulty breathing, worsening pain

## 2023-09-01 ENCOUNTER — HOSPITAL ENCOUNTER (OUTPATIENT)
Dept: RADIOLOGY | Facility: HOSPITAL | Age: 33
End: 2023-09-01
Payer: COMMERCIAL

## 2023-09-01 DIAGNOSIS — M54.9 DORSALGIA: ICD-10-CM

## 2023-09-01 PROCEDURE — 72110 X-RAY EXAM L-2 SPINE 4/>VWS: CPT

## 2023-09-01 PROCEDURE — 72072 X-RAY EXAM THORAC SPINE 3VWS: CPT

## 2024-03-21 ENCOUNTER — HOSPITAL ENCOUNTER (EMERGENCY)
Facility: HOSPITAL | Age: 34
Discharge: HOME | End: 2024-03-21
Attending: EMERGENCY MEDICINE
Payer: COMMERCIAL

## 2024-03-21 ENCOUNTER — APPOINTMENT (EMERGENCY)
Dept: RADIOLOGY | Facility: HOSPITAL | Age: 34
End: 2024-03-21
Payer: COMMERCIAL

## 2024-03-21 VITALS
SYSTOLIC BLOOD PRESSURE: 153 MMHG | WEIGHT: 220 LBS | OXYGEN SATURATION: 100 % | HEART RATE: 88 BPM | TEMPERATURE: 98 F | BODY MASS INDEX: 41.54 KG/M2 | HEIGHT: 61 IN | DIASTOLIC BLOOD PRESSURE: 88 MMHG | RESPIRATION RATE: 18 BRPM

## 2024-03-21 DIAGNOSIS — S82.832A CLOSED FRACTURE OF PROXIMAL END OF LEFT FIBULA, UNSPECIFIED FRACTURE MORPHOLOGY, INITIAL ENCOUNTER: Primary | ICD-10-CM

## 2024-03-21 DIAGNOSIS — R20.2 PARESTHESIA OF LEFT LEG: ICD-10-CM

## 2024-03-21 PROCEDURE — 99283 EMERGENCY DEPT VISIT LOW MDM: CPT

## 2024-03-21 PROCEDURE — 73564 X-RAY EXAM KNEE 4 OR MORE: CPT

## 2024-03-21 PROCEDURE — 73590 X-RAY EXAM OF LOWER LEG: CPT

## 2024-03-21 PROCEDURE — 99284 EMERGENCY DEPT VISIT MOD MDM: CPT | Performed by: EMERGENCY MEDICINE

## 2024-03-21 RX ORDER — ACETAMINOPHEN 325 MG/1
650 TABLET ORAL EVERY 6 HOURS PRN
Qty: 40 TABLET | Refills: 0 | Status: SHIPPED | OUTPATIENT
Start: 2024-03-21

## 2024-03-21 RX ORDER — IBUPROFEN 600 MG/1
600 TABLET ORAL EVERY 8 HOURS PRN
Qty: 20 TABLET | Refills: 0 | Status: SHIPPED | OUTPATIENT
Start: 2024-03-21

## 2024-03-21 RX ORDER — ACETAMINOPHEN 325 MG/1
975 TABLET ORAL ONCE
Status: COMPLETED | OUTPATIENT
Start: 2024-03-21 | End: 2024-03-21

## 2024-03-21 RX ORDER — IBUPROFEN 600 MG/1
600 TABLET ORAL ONCE
Status: COMPLETED | OUTPATIENT
Start: 2024-03-21 | End: 2024-03-21

## 2024-03-21 RX ADMIN — IBUPROFEN 600 MG: 600 TABLET, FILM COATED ORAL at 16:02

## 2024-03-21 RX ADMIN — ACETAMINOPHEN 975 MG: 325 TABLET, FILM COATED ORAL at 16:02

## 2024-03-21 NOTE — Clinical Note
Juan Brown was seen and treated in our emergency department on 3/21/2024.                Diagnosis: Left leg fracture    Juan kennedy return to work on return date.    She may return on this date: 03/25/2024    Please allow Ms. Brown to use the knee immobilizer and crutches until cleared by an orthopedic surgeon     If you have any questions or concerns, please don't hesitate to call.      Arya Sanchez MD    ______________________________           _______________          _______________  Hospital Representative                              Date                                Time

## 2024-03-21 NOTE — DISCHARGE INSTRUCTIONS
As we discussed, the tingling in your foot is likely due to irritation or injury to the peroneal nerve.  If you develop any weakness in the foot, return immediately to the emergency department for evaluation

## 2024-03-22 NOTE — ED PROVIDER NOTES
Pt Name: Juan Brown  MRN: [id number]  Birthdate 1990  Age/Sex: 34 y.o. female  Date of evaluation: 3/21/2024  PCP: Ayana Aden MD    CHIEF COMPLAINT    Chief Complaint   Patient presents with    Knee Pain     Patient states she slipped & fell, extending her leg forward. C/o burning pain in knee to foot          HPI    34 y.o. female presenting with left knee and leg pain.  Patient states that she slipped on a slippery floor shortly prior to arrival, describes a hyperextension of her left leg, noted immediate pain and inability to walk.  The pain is severe, dull, burning, going from the left side of the knee radiating down towards the foot, with some tingling in the lateral aspect of the foot but no numbness or weakness.  She denies any other pain or injuries.      HPI      Past Medical and Surgical History    History reviewed. No pertinent past medical history.    Past Surgical History:   Procedure Laterality Date     SECTION         History reviewed. No pertinent family history.    Social History     Tobacco Use    Smoking status: Never   Vaping Use    Vaping status: Never Used   Substance Use Topics    Alcohol use: Not Currently    Drug use: Not Currently           Allergies    No Known Allergies    Home Medications    Prior to Admission medications    Medication Sig Start Date End Date Taking? Authorizing Provider   acetaminophen (TYLENOL) 325 mg tablet Take 2 tablets (650 mg total) by mouth every 6 (six) hours as needed for mild pain 3/21/24  Yes Arya Sanchez MD   ibuprofen (MOTRIN) 600 mg tablet Take 1 tablet (600 mg total) by mouth every 8 (eight) hours as needed for moderate pain 3/21/24  Yes Arya Sanchez MD   lidocaine (Lidoderm) 5 % Apply 1 patch topically over 12 hours daily Remove & Discard patch within 12 hours or as directed by MD 23   Lashawn Kowalski PA-C           Review of Systems    Review of Systems   Constitutional:  Negative for activity change, chills and  fever.   HENT:  Negative for drooling and facial swelling.    Eyes:  Negative for pain, discharge and visual disturbance.   Respiratory:  Negative for apnea, cough, chest tightness, shortness of breath and wheezing.    Cardiovascular:  Negative for chest pain and leg swelling.   Gastrointestinal:  Negative for abdominal pain, constipation, diarrhea, nausea and vomiting.   Genitourinary:  Negative for difficulty urinating, dysuria and urgency.   Musculoskeletal:  Positive for arthralgias and gait problem. Negative for back pain.   Skin:  Negative for color change and rash.   Neurological:  Negative for dizziness, speech difficulty, weakness and headaches.   Psychiatric/Behavioral:  Negative for agitation, behavioral problems and confusion.            All other systems reviewed and negative.    Physical Exam      ED Triage Vitals   Temperature Pulse Respirations Blood Pressure SpO2   03/21/24 1523 03/21/24 1523 03/21/24 1523 03/21/24 1523 03/21/24 1523   98 °F (36.7 °C) 88 18 153/88 100 %      Temp Source Heart Rate Source Patient Position - Orthostatic VS BP Location FiO2 (%)   03/21/24 1523 03/21/24 1523 03/21/24 1523 03/21/24 1523 --   Oral Monitor Sitting Left arm       Pain Score       03/21/24 1602       10 - Worst Possible Pain               Physical Exam  Vitals and nursing note reviewed.   Constitutional:       General: She is not in acute distress.     Appearance: She is well-developed. She is not ill-appearing, toxic-appearing or diaphoretic.   HENT:      Head: Normocephalic and atraumatic.      Right Ear: External ear normal.      Left Ear: External ear normal.      Nose: Nose normal. No congestion or rhinorrhea.      Mouth/Throat:      Mouth: Mucous membranes are moist.      Pharynx: Oropharynx is clear. No oropharyngeal exudate or posterior oropharyngeal erythema.   Eyes:      Conjunctiva/sclera: Conjunctivae normal.      Pupils: Pupils are equal, round, and reactive to light.   Cardiovascular:      Rate  and Rhythm: Normal rate and regular rhythm.      Pulses: Normal pulses.      Heart sounds: Normal heart sounds. No murmur heard.     No friction rub. No gallop.   Pulmonary:      Effort: Pulmonary effort is normal. No respiratory distress.      Breath sounds: Normal breath sounds. No wheezing or rales.   Abdominal:      General: There is no distension.      Palpations: Abdomen is soft.      Tenderness: There is no abdominal tenderness. There is no guarding or rebound.   Musculoskeletal:         General: Swelling and tenderness present. No deformity. Normal range of motion.      Cervical back: Normal range of motion and neck supple.      Right lower leg: No edema.      Left lower leg: No edema.      Comments: Left knee slightly swollen, tender to palpation along the lateral aspect overlying the proximal fibula, significant pain but no instability with varus stress, no pain or instability valgus stress or anterior drawer.  Strength sensation pulse cap refill intact distal, patient is complaining of paresthesias across the foot but sensation and strength intact.   Skin:     General: Skin is warm and dry.      Capillary Refill: Capillary refill takes less than 2 seconds.      Findings: No erythema or rash.   Neurological:      Mental Status: She is alert and oriented to person, place, and time.   Psychiatric:         Behavior: Behavior normal.         Thought Content: Thought content normal.         Judgment: Judgment normal.              Diagnostic Results      Labs:    Results Reviewed       None            All labs reviewed and utilized in the medical decision making process    Radiology:    XR knee 4+ vw left injury   ED Interpretation   Proximal fibula fracture, possibly secondary to avulsion      XR tibia fibula 2 views LEFT   ED Interpretation   Proximal fibula fracture, possibly secondary to avulsion            All radiology studies independently viewed by me and interpreted by the  radiologist.    Procedure    Procedures        ED Course of Care and Re-Assessments      Pain improved with Tylenol ibuprofen, placed in knee immobilizer by tech, appropriately placed and neurovascularly intact on my examination afterwards.  Given crutches.    Medications   acetaminophen (TYLENOL) tablet 975 mg (975 mg Oral Given 3/21/24 1602)   ibuprofen (MOTRIN) tablet 600 mg (600 mg Oral Given 3/21/24 1602)           FINAL IMPRESSION    Final diagnoses:   Closed fracture of proximal end of left fibula, unspecified fracture morphology, initial encounter   Paresthesia of left leg         DISPOSITION/PLAN    Presentation as above with closed fracture of the proximal fibula, suspected be associated with avulsion of tendon or ligament, possibly the LCL based on pain reproduced with varus stress.  No evidence of compartment syndrome or septic arthritis at this time.  Paresthesias concerning for possible compression or involvement of the peroneal nerve but no motor weakness or sensory loss at this time.  Patient counseled regarding findings, treat symptomatically, immobilized, given crutches, discharged with strict return precautions, follow-up with orthopedics.  Time reflects when diagnosis was documented in both MDM as applicable and the Disposition within this note       Time User Action Codes Description Comment    3/21/2024  5:16 PM Arya Sanchez Add [S82.832A] Closed fracture of proximal end of left fibula, unspecified fracture morphology, initial encounter     3/21/2024  5:17 PM Arya Sanchez Add [R20.2] Paresthesia     3/21/2024  5:17 PM Arya Sanchez Add [R20.2] Paresthesia of left leg     3/21/2024  5:17 PM Arya Sanchez Remove [R20.2] Paresthesia           ED Disposition       ED Disposition   Discharge    Condition   Stable    Date/Time   Thu Mar 21, 2024  5:15 PM    Comment   Juan Brown discharge to home/self care.                   Follow-up Information       Follow up With  Specialties Details Why Contact Info Additional Information    [facility] Emergency Department Emergency Medicine Go to  To discuss this visit and schedule close follow-up [address] [facility] [id number] [phone] [facility] Emergency Department, [address] [place], [place], [address]    Ayana Aden MD  Call in 1 day To discuss this visit and schedule close follow-up [address] [facility] [address]  [facility] [address]  [phone]       [facility] [place] [facility] Call in 1 day To discuss this visit and schedule close follow-up [address] [facility] [address]  [facility] [address]  [phone] [facility] [place], [address] [facility] [address], [place], [place], [address]   [phone]              PATIENT REFERRED TO:    [facility] Emergency Department  [address] [facility] [id number] [phone]  Go to   To discuss this visit and schedule close follow-up    Ayana Aden MD  [address] [address]  [phone]    Call in 1 day  To discuss this visit and schedule close follow-up    [facility] [place]  [address] [facility] [address]  [facility] [address]  [phone]  Call in 1 day  To discuss this visit and schedule close follow-up      DISCHARGE MEDICATIONS:    Discharge Medication List as of 3/21/2024  5:18 PM        START taking these medications    Details   acetaminophen (TYLENOL) 325 mg tablet Take 2 tablets (650 mg total) by mouth every 6 (six) hours as needed for mild pain, Starting Thu 3/21/2024, Print      ibuprofen (MOTRIN) 600 mg tablet Take 1 tablet (600 mg total) by mouth every 8 (eight) hours as needed for moderate pain, Starting Thu 3/21/2024, Print           CONTINUE these medications which have NOT CHANGED    Details   lidocaine (Lidoderm) 5  "% Apply 1 patch topically over 12 hours daily Remove & Discard patch within 12 hours or as directed by MD, Starting Thu 4/6/2023, Normal                      Arya Sanchez MD    Portions of the record may have been created with voice recognition software.  Occasional wrong word or \"sound alike\" substitutions may have occurred due to the inherent limitations of voice recognition software.  Please read the chart carefully and recognize, using context, where substitutions have occurred     Arya Sanchez MD  03/21/24 0336    "

## 2024-03-26 ENCOUNTER — OFFICE VISIT (OUTPATIENT)
Dept: OBGYN CLINIC | Facility: CLINIC | Age: 34
End: 2024-03-26
Payer: COMMERCIAL

## 2024-03-26 VITALS
SYSTOLIC BLOOD PRESSURE: 131 MMHG | DIASTOLIC BLOOD PRESSURE: 85 MMHG | WEIGHT: 220 LBS | HEIGHT: 61 IN | BODY MASS INDEX: 41.54 KG/M2 | HEART RATE: 94 BPM

## 2024-03-26 DIAGNOSIS — S82.832A CLOSED FRACTURE OF PROXIMAL END OF LEFT FIBULA, UNSPECIFIED FRACTURE MORPHOLOGY, INITIAL ENCOUNTER: ICD-10-CM

## 2024-03-26 PROCEDURE — 99203 OFFICE O/P NEW LOW 30 MIN: CPT | Performed by: ORTHOPAEDIC SURGERY

## 2024-03-26 RX ORDER — OXYCODONE HYDROCHLORIDE 5 MG/1
5 TABLET ORAL EVERY 6 HOURS PRN
Qty: 20 TABLET | Refills: 0 | Status: SHIPPED | OUTPATIENT
Start: 2024-03-26

## 2024-03-26 NOTE — LETTER
March 26, 2024     Patient: Juan Brown  YOB: 1990  Date of Visit: 3/26/2024      To Whom it May Concern:    Juan Brown is under my professional care. Juan was seen in my office on 3/26/2024. Juan may not return to work until otherwise stated.     If you have any questions or concerns, please don't hesitate to call.         Sincerely,          Campbell Lieberman MD

## 2024-03-26 NOTE — PATIENT INSTRUCTIONS
- Discussed conservative treatment with patient at length  - Weight bearing as tolerated left lower extremity   - STAT MRI of the left knee ordered to evaluate for occult fracture  vs ligamentous injury   - Cleared for Range of motion of the left knee in the TROM  - Begin physical therapy as directed    - Over the counter analgesics as needed / directed   - Ice / heat as directed   - Follow up after MRI is performed

## 2024-03-26 NOTE — PROGRESS NOTES
Orthopaedics Office Visit - 1st Patient Visit    ASSESSMENT/PLAN:    Assessment:   Left comminuted fibular head fracture   DOI 3/21/24   Unable to bear weight on left lower extremity without significant pain  Concern for LCL or PLC injury      Plan:   - Discussed conservative treatment with patient at length  - Weight bearing as tolerated left lower extremity   - STAT MRI of the left knee ordered to evaluate for occult fracture  vs ligamentous injury   - Cleared for Range of motion of the left knee in the TROM  - Begin physical therapy as directed    - Over the counter analgesics as needed / directed   - Ice / heat as directed   - Follow up after MRI is performed       To Do Next Visit:  Evaluate knee pain,   MRI left knee   _____________________________________________________  CHIEF COMPLAINT:  Chief Complaint   Patient presents with    Left Leg - Pain         SUBJECTIVE:  Juan Brown is a 34 y.o. female who presents to the office in evaluation of her left leg.  Patient states that on Friday, 3/21/2024 [place] and states that she slipped on a waxed floor.  Patient states that her left leg went out from under her and fell onto the left side causing experience left leg pain.  Patient was brought to the ER soon after x-rays were taken of the left leg which showed a left fibular head fracture.  Patient was placed in the immobilizer which has been maintained since her injury.  Patient was provided with a pair of crutches as well.  Patient states that she continues to have pain on the lateral aspect of the knee and upper leg and becomes worse with weight bearing, direct contact and in the evening hours.  Patient denies any prior history of left knee or lower extremity pain/injuries.  In addition to pain in her knee, patient is experiencing pain going down the entirety of her lower leg into her feet.  Patient does admit to having intermittent numbness and tingling in the toes and foot which occurs  "approximately 4 times daily.  Patient has been taking ibuprofen and Tylenol as needed for pain which provides minimal relief of symptoms.  Patient offers no other complaints at this time.      PAST MEDICAL HISTORY:  History reviewed. No pertinent past medical history.    PAST SURGICAL HISTORY:  Past Surgical History:   Procedure Laterality Date     SECTION         FAMILY HISTORY:  History reviewed. No pertinent family history.    SOCIAL HISTORY:  Social History     Tobacco Use    Smoking status: Never   Vaping Use    Vaping status: Never Used   Substance Use Topics    Alcohol use: Not Currently    Drug use: Not Currently       MEDICATIONS:    Current Outpatient Medications:     acetaminophen (TYLENOL) 325 mg tablet, Take 2 tablets (650 mg total) by mouth every 6 (six) hours as needed for mild pain, Disp: 40 tablet, Rfl: 0    ibuprofen (MOTRIN) 600 mg tablet, Take 1 tablet (600 mg total) by mouth every 8 (eight) hours as needed for moderate pain, Disp: 20 tablet, Rfl: 0    lidocaine (Lidoderm) 5 %, Apply 1 patch topically over 12 hours daily Remove & Discard patch within 12 hours or as directed by MD, Disp: 5 patch, Rfl: 0    ALLERGIES:  No Known Allergies    REVIEW OF SYSTEMS:  MSK: left knee pain   Neuro: WNL   Pertinent items are otherwise noted in HPI.  A comprehensive review of systems was otherwise negative.    LABS:  HgA1c: No results found for: \"HGBA1C\"  BMP:   Lab Results   Component Value Date    CALCIUM 9.2 2023    K 3.7 2023    CO2 26 2023     2023    BUN 15 2023    CREATININE 0.61 2023     CBC: No components found for: \"CBC\"    _____________________________________________________  PHYSICAL EXAMINATION:  Vital signs: /85   Pulse 94   Ht 5' 1\" (1.549 m)   Wt 99.8 kg (220 lb)   LMP 2024   BMI 41.57 kg/m²   General: No acute distress, awake and alert  Psychiatric: Mood and affect appear appropriate  HEENT: Trachea Midline, No torticollis, no " "apparent facial trauma  Cardiovascular: No audible murmurs; Extremities appear perfused  Pulmonary: No audible wheezing or stridor  Skin: No open lesions; see further details (if any) below      MUSCULOSKELETAL EXAMINATION:  Left knee examination:  - Patient sitting comfortably in the office in no apparent distress   - No acute visible abnormalities present in the left knee.  Extremity appears well-perfused overall.  -Extreme tenderness palpation noted over the proximal fibula, posterior lateral aspect of the left knee.  No other bony or soft tissue tenderness to palpation noted at this time.  -0 to approximately 10 degrees of flexion appreciated limited by extreme pain  - Special Tests       - Unable to perform special test at this time secondary to pain  - NV intact  _____________________________________________________  STUDIES REVIEWED:  I personally reviewed the images and interpretation is as follows:  Left knee XR 4 views performed on 3/21/24   Acute fracture of the fibular head , no fracture or dislocation      PROCEDURES PERFORMED:  No procedures were performed at this time.                        Willian Soliz PA-C - assisting  Campbell Lieberman MD                              Portions of the record may have been created with voice recognition software.  Occasional wrong word or \"sound a like\" substitutions may have occurred due to the inherent limitations of voice recognition software.  Read the chart carefully and recognize, using context, where substitutions have occurred.    "

## 2024-03-27 ENCOUNTER — HOSPITAL ENCOUNTER (OUTPATIENT)
Dept: MRI IMAGING | Facility: CLINIC | Age: 34
Discharge: HOME | End: 2024-03-27
Payer: COMMERCIAL

## 2024-03-27 DIAGNOSIS — S82.832A CLOSED FRACTURE OF PROXIMAL END OF LEFT FIBULA, UNSPECIFIED FRACTURE MORPHOLOGY, INITIAL ENCOUNTER: ICD-10-CM

## 2024-03-27 PROCEDURE — 73721 MRI JNT OF LWR EXTRE W/O DYE: CPT

## 2024-04-01 DIAGNOSIS — S82.832A CLOSED FRACTURE OF PROXIMAL END OF LEFT FIBULA, UNSPECIFIED FRACTURE MORPHOLOGY, INITIAL ENCOUNTER: Primary | ICD-10-CM

## 2024-04-08 ENCOUNTER — EVALUATION (OUTPATIENT)
Dept: PHYSICAL THERAPY | Facility: CLINIC | Age: 34
End: 2024-04-08
Payer: COMMERCIAL

## 2024-04-08 DIAGNOSIS — S82.832D CLOSED FRACTURE OF PROXIMAL END OF LEFT FIBULA WITH ROUTINE HEALING, UNSPECIFIED FRACTURE MORPHOLOGY, SUBSEQUENT ENCOUNTER: ICD-10-CM

## 2024-04-08 PROCEDURE — 97161 PT EVAL LOW COMPLEX 20 MIN: CPT | Performed by: PHYSICAL THERAPIST

## 2024-04-08 PROCEDURE — 97110 THERAPEUTIC EXERCISES: CPT | Performed by: PHYSICAL THERAPIST

## 2024-04-08 PROCEDURE — 97116 GAIT TRAINING THERAPY: CPT | Performed by: PHYSICAL THERAPIST

## 2024-04-09 ENCOUNTER — APPOINTMENT (OUTPATIENT)
Dept: RADIOLOGY | Facility: CLINIC | Age: 34
End: 2024-04-09
Payer: COMMERCIAL

## 2024-04-09 ENCOUNTER — OFFICE VISIT (OUTPATIENT)
Dept: OBGYN CLINIC | Facility: CLINIC | Age: 34
End: 2024-04-09
Payer: COMMERCIAL

## 2024-04-09 VITALS
BODY MASS INDEX: 41.54 KG/M2 | DIASTOLIC BLOOD PRESSURE: 71 MMHG | WEIGHT: 220 LBS | HEART RATE: 75 BPM | HEIGHT: 61 IN | SYSTOLIC BLOOD PRESSURE: 108 MMHG

## 2024-04-09 DIAGNOSIS — S82.832A CLOSED FRACTURE OF PROXIMAL END OF LEFT FIBULA, UNSPECIFIED FRACTURE MORPHOLOGY, INITIAL ENCOUNTER: ICD-10-CM

## 2024-04-09 PROCEDURE — 73560 X-RAY EXAM OF KNEE 1 OR 2: CPT

## 2024-04-09 PROCEDURE — 99213 OFFICE O/P EST LOW 20 MIN: CPT | Performed by: ORTHOPAEDIC SURGERY

## 2024-04-09 RX ORDER — ACETAMINOPHEN 325 MG/1
650 TABLET ORAL EVERY 6 HOURS PRN
Qty: 40 TABLET | Refills: 0 | Status: SHIPPED | OUTPATIENT
Start: 2024-04-09

## 2024-04-09 RX ORDER — IBUPROFEN 600 MG/1
600 TABLET ORAL EVERY 8 HOURS PRN
Qty: 20 TABLET | Refills: 0 | Status: SHIPPED | OUTPATIENT
Start: 2024-04-09

## 2024-04-09 NOTE — LETTER
April 9, 2024     Patient: Juan Brown  YOB: 1990  Date of Visit: 4/9/2024      To Whom it May Concern:    Juan Brown is under my professional care. Juan was seen in my office on 4/9/2024. Juan may not return to work until otherwise stated. Patient has follow up / reassessment in 4 weeks.     If you have any questions or concerns, please don't hesitate to call.         Sincerely,          Campbell Lieberman MD        CC: No Recipients

## 2024-04-09 NOTE — PROGRESS NOTES
Orthopaedics Office Visit - Follow up Patient Visit    ASSESSMENT/PLAN:    Assessment:   Left comminuted fibular head fracture   DOI 3/21/24   Continued pain, resolving   Knee stiffness as swelling resolves      Plan:   Weight bearing as tolerated left lower extremity   Continue physical therapy at this time   Over the counter analgesics as needed / directed   Ice / heat as directed   Follow up 4 weeks with repeat XR       To Do Next Visit:  XR left knee   _____________________________________________________  CHIEF COMPLAINT:  Chief Complaint   Patient presents with    Left Leg - Follow-up         SUBJECTIVE:  Juan Brown is a 34 y.o. female who presents to the office for follow-up evaluation of her left knee.  Patient underwent an MRI for the left knee on 3/27/2024.  Patient states that her knee is improved overall.  Patient states that she does continue to have pain in the lateral aspect of the knee that extends down into her foot.  Patient has been participating physical therapy as previously directed no complaints.  Patient has been maintaining her TROM brace as previously directed again with no complaints.  Patient has been taking ibuprofen, Tylenol, oxycodone as needed for pain.  Patient denies any numbness or tingling in the leg currently.  Patient has been weightbearing with use of crutches for assistance.  Patient offers no other complaints at this time.      PAST MEDICAL HISTORY:  History reviewed. No pertinent past medical history.    PAST SURGICAL HISTORY:  Past Surgical History:   Procedure Laterality Date     SECTION         FAMILY HISTORY:  History reviewed. No pertinent family history.    SOCIAL HISTORY:  Social History     Tobacco Use    Smoking status: Never   Vaping Use    Vaping status: Never Used   Substance Use Topics    Alcohol use: Not Currently    Drug use: Not Currently       MEDICATIONS:    Current Outpatient Medications:     acetaminophen (TYLENOL) 325 mg tablet, Take 2 tablets  "(650 mg total) by mouth every 6 (six) hours as needed for mild pain, Disp: 40 tablet, Rfl: 0    ibuprofen (MOTRIN) 600 mg tablet, Take 1 tablet (600 mg total) by mouth every 8 (eight) hours as needed for moderate pain, Disp: 20 tablet, Rfl: 0    oxyCODONE (Roxicodone) 5 immediate release tablet, Take 1 tablet (5 mg total) by mouth every 6 (six) hours as needed for moderate pain Max Daily Amount: 20 mg, Disp: 20 tablet, Rfl: 0    lidocaine (Lidoderm) 5 %, Apply 1 patch topically over 12 hours daily Remove & Discard patch within 12 hours or as directed by MD, Disp: 5 patch, Rfl: 0    ALLERGIES:  No Known Allergies    REVIEW OF SYSTEMS:  MSK: left knee pain   Neuro: WNL   Pertinent items are otherwise noted in HPI.  A comprehensive review of systems was otherwise negative.    LABS:  HgA1c: No results found for: \"HGBA1C\"  BMP:   Lab Results   Component Value Date    CALCIUM 9.2 04/06/2023    K 3.7 04/06/2023    CO2 26 04/06/2023     04/06/2023    BUN 15 04/06/2023    CREATININE 0.61 04/06/2023     CBC: No components found for: \"CBC\"    _____________________________________________________  PHYSICAL EXAMINATION:  Vital signs: /71   Pulse 75   Ht 5' 1\" (1.549 m)   Wt 99.8 kg (220 lb)   LMP 03/18/2024   BMI 41.57 kg/m²   General: No acute distress, awake and alert  Psychiatric: Mood and affect appear appropriate  HEENT: Trachea Midline, No torticollis, no apparent facial trauma  Cardiovascular: No audible murmurs; Extremities appear perfused  Pulmonary: No audible wheezing or stridor  Skin: No open lesions; see further details (if any) below      MUSCULOSKELETAL EXAMINATION:  Left knee Examination   Patient sitting comfortably in the office in no apparent distress   No acute visible abnormalities present in the left lower extremity.  Extremity appears well-perfused overall.  Tenderness palpation noted over the proximal fibula.  No other bony or soft tissue tenderness to palpation noted at this time.  0 to " "40 degrees of range of motion of the knee appreciated limited by pain.  Full range of motion of the ankle appreciated at this time.  NV intact    _____________________________________________________  STUDIES REVIEWED:  I personally reviewed the images and interpretation is as follows:  Left knee XR 2 views:  Healing proximal fibula fracture in acceptable position         PROCEDURES PERFORMED:  Procedures                  Willian Soliz PA-C - assisting  Campbell Lieberman MD                            Portions of the record may have been created with voice recognition software.  Occasional wrong word or \"sound a like\" substitutions may have occurred due to the inherent limitations of voice recognition software.  Read the chart carefully and recognize, using context, where substitutions have occurred.    "

## 2024-04-09 NOTE — PATIENT INSTRUCTIONS
Weight bearing as tolerated left lower extremity   Continue physical therapy at this time   Over the counter analgesics as needed / directed   Ice / heat as directed   Follow up 4 weeks with repeat XR    no

## 2024-04-09 NOTE — PROGRESS NOTES
PT Evaluation     Today's date: 2024  Patient name: Juan Brown  : 1990  MRN: [id number]  Referring provider: Willian Soliz PA*  Dx:   Encounter Diagnosis     ICD-10-CM    1. Closed fracture of proximal end of left fibula, unspecified fracture morphology, initial encounter  S82.832A Ambulatory Referral to Physical Therapy                     Assessment  Assessment details: Pt is a 35 y/o female who presents to physical therapy with primary nociceptive pain s/p slip and fall with subsequent proximal fibular head fracture. Pt does not present with any red flag symptoms at this time. Pt presents with high severity of pain, reduced knee ROM, abnormal gait, and swelling. Significant time spent today discussing how to reduce swelling at home. Compression sleeve provided. Refitting of the brace was performed. Refitting of the crutches and education on proper use completed today. Education provided regarding POC, prognosis and HEP, pt verablized understanding. Pt would benefit from skilled physical therapy in order to decrease deficits and return to prior level of function.    Impairments: abnormal gait, abnormal or restricted ROM, activity intolerance, impaired physical strength and pain with function    Symptom irritability: highUnderstanding of Dx/Px/POC: good  Goals  STG (4 weeks):  Pt will be independent with HEP.  Pt will demonstrate increase in flexion ROM >120d.  Pt will demonstrate no effusion.  Pt will demonstrate SLR with no extension lag.    LTG (8 weeks):  FOTO will be expected outcome.   Pt will demonstrate L knee ROM comparable to contralateral limb.  Pt will demonstrate MMT grade comparable to contralateral limb in all deficient muscle groups.  Pt will demonstrate ability to ambulate on level surface without AD and no assistance for 150ft.      Plan  Patient would benefit from: skilled physical therapy  Planned modality interventions: cryotherapy  Planned therapy interventions: manual  therapy, neuromuscular re-education, patient education, self care, strengthening, stretching, therapeutic activities, therapeutic exercise and home exercise program  Frequency: 1-2x/week.  Duration in weeks: 8  Treatment plan discussed with: patient    Subjective Evaluation    History of Present Illness  Mechanism of injury: Chief Complaint: Pt reports on 3/21/24, she slipped and fell on a freshly waxed floor while chasing after her daughter. She felt immediate pain and was taken to the ED. Radiographs confirmed avulsion fracture of the proximal fibular head. She saw Dr. Lieberman  after and was given a TROM brace that is unlocked. She reports she is having difficulty keeping the swelling down at this time and she still has a significant amount of pain.     Severity: severe  Irritability: high  Nature: nociceptive fracture  Stage: subacute  Stability: improving    P1: see body chart  Patient Goals  Patient goal: return to work, be able to walk without pain      Objective     Observations   Left Knee   Positive for edema and effusion.     Active Range of Motion   Left Knee   Flexion: 65 degrees   Extension: 0 degrees     Right Knee   Normal active range of motion    Passive Range of Motion     Additional Passive Range of Motion Details  Due to severity of pain, unable to get ROM further than 70d, firm end feel not reached           Precautions: none    POC expires Unit limit Auth Expiration date PT/OT/ST + Visit Limit?   6/3/24                              Visit/Unit Tracking  AUTH Status:  Date 4/8               Used 1               Remaining                        Manuals 4/8                                                                Neuro Re-Ed             Quad set             Glute set                                                                              Ther Ex             Knee PROM TANISHA 5'            SLR             Sidelying hip abd             Heel slide                                                     Pt edu TANISHA            Ther Activity                                       Gait Training             Crutches TANISHA                         Modalities

## 2024-04-11 ENCOUNTER — OFFICE VISIT (OUTPATIENT)
Dept: PHYSICAL THERAPY | Facility: CLINIC | Age: 34
End: 2024-04-11
Payer: COMMERCIAL

## 2024-04-11 DIAGNOSIS — S82.832D CLOSED FRACTURE OF PROXIMAL END OF LEFT FIBULA WITH ROUTINE HEALING, UNSPECIFIED FRACTURE MORPHOLOGY, SUBSEQUENT ENCOUNTER: Primary | ICD-10-CM

## 2024-04-11 PROCEDURE — 97110 THERAPEUTIC EXERCISES: CPT | Performed by: PHYSICAL THERAPIST

## 2024-04-11 NOTE — PROGRESS NOTES
"Daily Note     Today's date: 2024  Patient name: Juan Brown  : 1990  MRN: [id number]  Referring provider: Willian Soliz PA*  Dx:   Encounter Diagnosis     ICD-10-CM    1. Closed fracture of proximal end of left fibula with routine healing, unspecified fracture morphology, subsequent encounter  S82.408Q                      Subjective: Pt reports that the exercises are helping at home.      Objective: See treatment diary below      Assessment: Tolerated treatment well. Bike for ROM. PROM 0-88d. Good quad activation noted. Will add in SLR at next follow-up. Firm end feel not acquired today. Patient would benefit from continued PT      Plan: Continue per plan of care.  Progress treatment as tolerated.       Precautions: none    POC expires Unit limit Auth Expiration date PT/OT/ST + Visit Limit?   6/3/24                              Visit/Unit Tracking  AUTH Status:  Date                Used 1               Remaining                        Manuals                                                                Neuro Re-Ed             Quad set  20x3\"           Glute set                                                                              Ther Ex             Knee PROM TANISHA 5' Tanisha 15'           SLR             Sidelying hip abd             Heel slide  20x                                     Bike  6' partial ROM           Pt edu TANISHA            Ther Activity                                       Gait Training             Crutches TANISHA                         Modalities                                            "

## 2024-04-15 ENCOUNTER — APPOINTMENT (OUTPATIENT)
Dept: PHYSICAL THERAPY | Facility: CLINIC | Age: 34
End: 2024-04-15
Payer: COMMERCIAL

## 2024-04-15 DIAGNOSIS — S82.832A CLOSED FRACTURE OF PROXIMAL END OF LEFT FIBULA, UNSPECIFIED FRACTURE MORPHOLOGY, INITIAL ENCOUNTER: Primary | ICD-10-CM

## 2024-04-15 RX ORDER — METHYLPREDNISOLONE 4 MG/1
TABLET ORAL
Qty: 1 EACH | Refills: 0 | Status: SHIPPED | OUTPATIENT
Start: 2024-04-15

## 2024-04-18 ENCOUNTER — OFFICE VISIT (OUTPATIENT)
Dept: PHYSICAL THERAPY | Facility: CLINIC | Age: 34
End: 2024-04-18
Payer: COMMERCIAL

## 2024-04-18 DIAGNOSIS — S82.832D CLOSED FRACTURE OF PROXIMAL END OF LEFT FIBULA WITH ROUTINE HEALING, UNSPECIFIED FRACTURE MORPHOLOGY, SUBSEQUENT ENCOUNTER: Primary | ICD-10-CM

## 2024-04-18 PROCEDURE — 97110 THERAPEUTIC EXERCISES: CPT

## 2024-04-18 NOTE — PROGRESS NOTES
"Daily Note     Today's date: 2024  Patient name: Juan Brown  : 1990  MRN: [id number]  Referring provider: Willian Soliz PA*  Dx:   Encounter Diagnosis     ICD-10-CM    1. Closed fracture of proximal end of left fibula with routine healing, unspecified fracture morphology, subsequent encounter  S82.809P                      Subjective: Pt reports the day after last session she started experiencing N/T into anterior L thigh, shin/calf, and 1st and 2nd toes.  She reports increased discomfort with sitting and lower back spasms with this.  She reports taking prednisone for last 4 days and denies this pain currently.        Objective: See treatment diary below      Assessment: AROM L knee 0-74*.  Achieves 94* following PROM.  Still unable to achieve firm end feel today.  Good quad set present, however unable to progress to SLR secondary to pain and strength deficits.  Pt would benefit from continued PT in order to improve L shoulder ROM and strength for improved function during daily activities.      Plan: Continue per plan of care.      Precautions: none    POC expires Unit limit Auth Expiration date PT/OT/ST + Visit Limit?   6/3/24                              Visit/Unit Tracking  AUTH Status:  Date             Approved - 12 visits Used 1 2 3             Remaining  11 10 9                    Manuals                                                               Neuro Re-Ed             Quad set  20x3\" 5\"x20          Glute set                                                                              Ther Ex             Knee PROM TANISHA 5' Tanisha 15' AF          SLR   unable          Sidelying hip abd             Heel slide  20x x20          SAQ   x10                       Bike  6' partial ROM 5' partial ROM          Pt edu TANISHA  AF          Ther Activity                                       Gait Training             Crutches TANISHA                         Modalities                      "

## 2024-04-22 ENCOUNTER — APPOINTMENT (OUTPATIENT)
Dept: PHYSICAL THERAPY | Facility: CLINIC | Age: 34
End: 2024-04-22
Payer: COMMERCIAL

## 2024-04-25 ENCOUNTER — OFFICE VISIT (OUTPATIENT)
Dept: PHYSICAL THERAPY | Facility: CLINIC | Age: 34
End: 2024-04-25
Payer: COMMERCIAL

## 2024-04-25 DIAGNOSIS — S82.832D CLOSED FRACTURE OF PROXIMAL END OF LEFT FIBULA WITH ROUTINE HEALING, UNSPECIFIED FRACTURE MORPHOLOGY, SUBSEQUENT ENCOUNTER: Primary | ICD-10-CM

## 2024-04-25 PROCEDURE — 97110 THERAPEUTIC EXERCISES: CPT | Performed by: PHYSICAL THERAPIST

## 2024-04-25 NOTE — PROGRESS NOTES
"Daily Note     Today's date: 2024  Patient name: Juan Brown  : 1990  MRN: [id number]  Referring provider: Willian Soliz PA*  Dx:   Encounter Diagnosis     ICD-10-CM    1. Closed fracture of proximal end of left fibula with routine healing, unspecified fracture morphology, subsequent encounter  S82.262D                      Subjective: Pt's son was in a trampoline accident where he broke his arm and therefore she was getting him situated prior to coming to PT, arrived 15 mins late.       Objective: See treatment diary below      Assessment: Tolerated treatment well. PROM 0-107d. Quad strength improved. Had to modify exercise today due to low back pain, but able to complete without increasing. Patient would benefit from continued PT      Plan: Continue per plan of care.  Progress treatment as tolerated.       Precautions: none    POC expires Unit limit Auth Expiration date PT/OT/ST + Visit Limit?   6/3/24                              Visit/Unit Tracking  AUTH Status:  Date             Approved - 12 visits Used 1 2 3             Remaining  11 10 9                    Manuals                                                              Neuro Re-Ed             Quad set  20x3\" 5\"x20          Glute set                                                                              Ther Ex             Knee PROM TANISHA 5' Tanisha 15' AF TANISHA 15'         SLR   unable 20x         Sidelying hip abd             Heel slide  20x x20          SAQ   x10 X20                       Bike  6' partial ROM 5' partial ROM 5' partial ROM         Pt edu TANISHA  AF          Ther Activity                                       Gait Training             Crutches TANISHA                         Modalities                                                "

## 2024-04-29 ENCOUNTER — APPOINTMENT (OUTPATIENT)
Dept: PHYSICAL THERAPY | Facility: CLINIC | Age: 34
End: 2024-04-29
Payer: COMMERCIAL

## 2024-05-02 ENCOUNTER — OFFICE VISIT (OUTPATIENT)
Dept: PHYSICAL THERAPY | Facility: CLINIC | Age: 34
End: 2024-05-02
Payer: COMMERCIAL

## 2024-05-02 DIAGNOSIS — S82.832D CLOSED FRACTURE OF PROXIMAL END OF LEFT FIBULA WITH ROUTINE HEALING, UNSPECIFIED FRACTURE MORPHOLOGY, SUBSEQUENT ENCOUNTER: Primary | ICD-10-CM

## 2024-05-02 PROCEDURE — 97110 THERAPEUTIC EXERCISES: CPT

## 2024-05-02 NOTE — PROGRESS NOTES
"Daily Note     Today's date: 2024  Patient name: Juan Brown  : 1990  MRN: [id number]  Referring provider: Willian Soliz PA*  Dx:   Encounter Diagnosis     ICD-10-CM    1. Closed fracture of proximal end of left fibula with routine healing, unspecified fracture morphology, subsequent encounter  S82.135C                      Subjective: Pt reports she feels she is improving.  Reports less pain.  States she has been working on HEP.      Objective: See treatment diary below      Assessment: AROM 0-95*, PROM 0-120* with pain and firm end feel.  Quad weakness present.  Difficulty achieving TKE during SAQ.  Still remains challenged with SLR but is able to complete without complications.  Pt would benefit from continue PT.      Plan: Continue per plan of care.      Precautions: none    POC expires Unit limit Auth Expiration date PT/OT/ST + Visit Limit?   6/3/24                              Visit/Unit Tracking  AUTH Status:  Date            Approved - 12 visits Used 1 2 3 4            Remaining  11 10 9 8                   Manuals                                                             Neuro Re-Ed             Quad set  20x3\" 5\"x20          Glute set                                                                              Ther Ex             Knee PROM TANISHA 5' Tanisha 15' AF TANISHA 15' AF         SLR   unable 20x x20        Sidelying hip abd             Heel slide  20x x20  x20        SAQ   x10 X20  x20                     Bike  6' partial ROM 5' partial ROM 5' partial ROM 5' partial/full ROM        Pt edu TANISHA  AF  AF        Ther Activity                                       Gait Training             Crutches TANISHA                         Modalities                                                  "

## 2024-05-05 DIAGNOSIS — S82.832A CLOSED FRACTURE OF PROXIMAL END OF LEFT FIBULA, UNSPECIFIED FRACTURE MORPHOLOGY, INITIAL ENCOUNTER: Primary | ICD-10-CM

## 2024-05-07 ENCOUNTER — APPOINTMENT (OUTPATIENT)
Dept: RADIOLOGY | Facility: CLINIC | Age: 34
End: 2024-05-07
Payer: COMMERCIAL

## 2024-05-07 ENCOUNTER — OFFICE VISIT (OUTPATIENT)
Dept: OBGYN CLINIC | Facility: CLINIC | Age: 34
End: 2024-05-07
Payer: COMMERCIAL

## 2024-05-07 VITALS
BODY MASS INDEX: 41.54 KG/M2 | WEIGHT: 220 LBS | HEIGHT: 61 IN | SYSTOLIC BLOOD PRESSURE: 124 MMHG | HEART RATE: 86 BPM | DIASTOLIC BLOOD PRESSURE: 78 MMHG

## 2024-05-07 DIAGNOSIS — M62.559 ATROPHY OF QUADRICEPS FEMORIS MUSCLE: ICD-10-CM

## 2024-05-07 DIAGNOSIS — S82.832A CLOSED FRACTURE OF PROXIMAL END OF LEFT FIBULA, UNSPECIFIED FRACTURE MORPHOLOGY, INITIAL ENCOUNTER: Primary | ICD-10-CM

## 2024-05-07 DIAGNOSIS — S82.832A CLOSED FRACTURE OF PROXIMAL END OF LEFT FIBULA, UNSPECIFIED FRACTURE MORPHOLOGY, INITIAL ENCOUNTER: ICD-10-CM

## 2024-05-07 PROCEDURE — 73560 X-RAY EXAM OF KNEE 1 OR 2: CPT

## 2024-05-07 PROCEDURE — 99213 OFFICE O/P EST LOW 20 MIN: CPT | Performed by: ORTHOPAEDIC SURGERY

## 2024-05-07 NOTE — LETTER
May 7, 2024     Patient: Juan Brown  YOB: 1990  Date of Visit: 5/7/2024      To Whom it May Concern:    Juan Brown is under my professional care. Juan was seen in my office on 5/7/2024. Juan may not return to work until otherwise stated.     If you have any questions or concerns, please don't hesitate to call.         Sincerely,          Campbell Lieberman MD        CC: No Recipients

## 2024-05-07 NOTE — PATIENT INSTRUCTIONS
Weight bearing as tolerated left lower extremity   Transition from TROM brace to hinged knee brace   Continue physical therapy   Over the counter analgesics as needed / directed   Ice / heat as directed   Follow up 4 weeks with repeat XR

## 2024-05-07 NOTE — PROGRESS NOTES
Orthopaedics Office Visit - Follow up Patient Visit    ASSESSMENT/PLAN:    Assessment:   6 weeks s/p closed treatment of a Left comminuted fibular head fracture   DOI 3/21/24   Continued pain, resolving     Now with development of more patellofemoral pain pattern      Plan:   Weight bearing as tolerated left lower extremity   Transition from TROM brace to hinged knee brace   Continue physical therapy - add focus on PFM modalities  Over the counter analgesics as needed / directed   Ice / heat as directed   Follow up 4 weeks with repeat XR       To Do Next Visit:  XR left knee , consideration for initiation of new position/job    _____________________________________________________  CHIEF COMPLAINT:  Chief Complaint   Patient presents with    Left Knee - Follow-up         SUBJECTIVE:  Juan Brown is a 34 y.o. female who presents  6 weeks s/p closed treatment of a Left comminuted fibular head fracture   DOI 3/21/24.  Patient states that she does continue to have pain on the lateral aspect of the knee becomes worse in the evening hours.  Patient has been full weightbearing with the use of a TROM brace.  Patient has been attending physical therapy as previously directed with no complaints.  Patient takes ibuprofen and Tylenol as needed for pain.  Patient denies any numbness or tingling in the foot or leg.  Patient offers no other complaints at this time.      PAST MEDICAL HISTORY:  History reviewed. No pertinent past medical history.    PAST SURGICAL HISTORY:  Past Surgical History:   Procedure Laterality Date     SECTION         FAMILY HISTORY:  History reviewed. No pertinent family history.    SOCIAL HISTORY:  Social History     Tobacco Use    Smoking status: Never   Vaping Use    Vaping status: Never Used   Substance Use Topics    Alcohol use: Not Currently    Drug use: Not Currently       MEDICATIONS:    Current Outpatient Medications:     acetaminophen (TYLENOL) 325 mg tablet, Take 2 tablets (650 mg total)  "by mouth every 6 (six) hours as needed for mild pain, Disp: 40 tablet, Rfl: 0    ibuprofen (MOTRIN) 600 mg tablet, Take 1 tablet (600 mg total) by mouth every 8 (eight) hours as needed for moderate pain, Disp: 20 tablet, Rfl: 0    methylPREDNISolone 4 MG tablet therapy pack, Use as directed on package, Disp: 1 each, Rfl: 0    oxyCODONE (Roxicodone) 5 immediate release tablet, Take 1 tablet (5 mg total) by mouth every 6 (six) hours as needed for moderate pain Max Daily Amount: 20 mg, Disp: 20 tablet, Rfl: 0    lidocaine (Lidoderm) 5 %, Apply 1 patch topically over 12 hours daily Remove & Discard patch within 12 hours or as directed by MD, Disp: 5 patch, Rfl: 0    ALLERGIES:  No Known Allergies    REVIEW OF SYSTEMS:  MSK: left knee pain   Neuro: WNL   Pertinent items are otherwise noted in HPI.  A comprehensive review of systems was otherwise negative.    LABS:  HgA1c: No results found for: \"HGBA1C\"  BMP:   Lab Results   Component Value Date    CALCIUM 9.2 04/06/2023    K 3.7 04/06/2023    CO2 26 04/06/2023     04/06/2023    BUN 15 04/06/2023    CREATININE 0.61 04/06/2023     CBC: No components found for: \"CBC\"    _____________________________________________________  PHYSICAL EXAMINATION:  Vital signs: /78   Pulse 86   Ht 5' 1\" (1.549 m)   Wt 99.8 kg (220 lb)   BMI 41.57 kg/m²   General: No acute distress, awake and alert  Psychiatric: Mood and affect appear appropriate  HEENT: Trachea Midline, No torticollis, no apparent facial trauma  Cardiovascular: No audible murmurs; Extremities appear perfused  Pulmonary: No audible wheezing or stridor  Skin: No open lesions; see further details (if any) below      MUSCULOSKELETAL EXAMINATION:  Left knee examination :  Patient sitting comfortably in the office in no apparent distress   No acute visible abnormalities present.   TTP noted over the fibula head. No other bony or soft tissue ttp noted   0-90 degrees noted limited by pain   NV " "intact      _____________________________________________________  STUDIES REVIEWED:  I personally reviewed the images and interpretation is as follows:  Left knee XR 2 views:  Healing proximal fibula fracture in acceptable alignment         PROCEDURES PERFORMED:  No procedures were performed at this time.                   Willian Soliz PA-C - assisting  Campbell Lieberman MD                          Portions of the record may have been created with voice recognition software.  Occasional wrong word or \"sound a like\" substitutions may have occurred due to the inherent limitations of voice recognition software.  Read the chart carefully and recognize, using context, where substitutions have occurred.      "

## 2024-05-20 ENCOUNTER — APPOINTMENT (OUTPATIENT)
Dept: PHYSICAL THERAPY | Facility: CLINIC | Age: 34
End: 2024-05-20
Payer: COMMERCIAL

## 2024-05-22 ENCOUNTER — OFFICE VISIT (OUTPATIENT)
Dept: PHYSICAL THERAPY | Facility: CLINIC | Age: 34
End: 2024-05-22
Payer: COMMERCIAL

## 2024-05-22 DIAGNOSIS — S82.832D CLOSED FRACTURE OF PROXIMAL END OF LEFT FIBULA WITH ROUTINE HEALING, UNSPECIFIED FRACTURE MORPHOLOGY, SUBSEQUENT ENCOUNTER: Primary | ICD-10-CM

## 2024-05-22 PROCEDURE — 97110 THERAPEUTIC EXERCISES: CPT

## 2024-05-22 NOTE — PROGRESS NOTES
"Daily Note     Today's date: 2024  Patient name: Juan Brown  : 1990  MRN: [id number]  Referring provider: Willian Soliz PA*  Dx:   Encounter Diagnosis     ICD-10-CM    1. Closed fracture of proximal end of left fibula with routine healing, unspecified fracture morphology, subsequent encounter  S82.832D           Start Time: 1430  Stop Time: 1517  Total time in clinic (min): 47 minutes    Subjective: Pt reports that she needs to focus on strength as per her last physician appointment.       Objective: See treatment diary below      Assessment: AROM 0-95*, PROM 0-120*. Progressed this visit with S/L ABD. Pt was most challenged with SLR. 1:1 with PTA for entirety. Pt would benefit from continue PT.      Plan: Continue per plan of care.      Precautions: none    POC expires Unit limit Auth Expiration date PT/OT/ST + Visit Limit?   6/3/24                              Visit/Unit Tracking  AUTH Status:  Date           Approved - 12 visits Used 1 2 3 4 5           Remaining  11 10 9 8 7                  Manuals                                                            Neuro Re-Ed             Quad set  20x3\" 5\"x20   5\"x20       Glute set                                                                              Ther Ex             Knee PROM TANISHA 5' Tanisha 15' AF TANISHA 15' AF  SA       SLR   unable 20x x20 x20       Sidelying hip abd      20x       Heel slide  20x x20  x20 x20       SAQ   x10 X20  x20 x20                    Bike  6' partial ROM 5' partial ROM 5' partial ROM 5' partial/full ROM 5' partial/full ROM       Pt edu TANISHA  AF  AF        Ther Activity                                       Gait Training             Crutches TANISHA                         Modalities                                                  "

## 2024-05-28 ENCOUNTER — OFFICE VISIT (OUTPATIENT)
Dept: PHYSICAL THERAPY | Facility: CLINIC | Age: 34
End: 2024-05-28
Payer: COMMERCIAL

## 2024-05-28 DIAGNOSIS — S82.832D CLOSED FRACTURE OF PROXIMAL END OF LEFT FIBULA WITH ROUTINE HEALING, UNSPECIFIED FRACTURE MORPHOLOGY, SUBSEQUENT ENCOUNTER: Primary | ICD-10-CM

## 2024-05-28 PROCEDURE — 97110 THERAPEUTIC EXERCISES: CPT

## 2024-05-28 PROCEDURE — 97530 THERAPEUTIC ACTIVITIES: CPT

## 2024-05-28 NOTE — PROGRESS NOTES
"Daily Note     Today's date: 2024  Patient name: Juan Brown  : 1990  MRN: [id number]  Referring provider: Willian Soliz PA*  Dx:   Encounter Diagnosis     ICD-10-CM    1. Closed fracture of proximal end of left fibula with routine healing, unspecified fracture morphology, subsequent encounter  S82.525Y                      Subjective: She reports she is feeling better overall but is lacking strength.      Objective: See treatment diary below      Assessment: AROM 0-111*.  PROM is pain free.  Unequal weight distribution during mini squats but able to correct with VC.  Patient experiences L achilles pain with mini squat but is able to complete.  Pt would benefit from continued PT in order to improve L knee strength for improved function during daily activities.      Plan: Continue per plan of care.      Precautions: none    POC expires Unit limit Auth Expiration date PT/OT/ST + Visit Limit?   6/3/24                              Visit/Unit Tracking  AUTH Status:  Date          Approved - 12 visits Used 1 2 3 4 5 6          Remaining  11 10 9 8 7 6                 Manuals                                                           Neuro Re-Ed             Quad set  20x3\" 5\"x20   5\"x20       Glute set                                                                              Ther Ex             Knee PROM TANISHA 5' Tanisha 15' AF TANISHA 15' AF  SA AF      SLR   unable 20x x20 x20 2x10      Sidelying hip abd      20x x20      Heel slide  20x x20  x20 x20       SAQ   x10 X20  x20 x20                    Bike  6' partial ROM 5' partial ROM 5' partial ROM 5' partial/full ROM 5' partial/full ROM 5' partial/full ROM       Mini squat       2x10      Leg press       65# 2x12      Pt edu TANISHA  AF  AF  AF      Ther Activity             Step up       6\" x20      Retro walk c [place]       10# x10      Gait Training             Crutches TANISHA                       "   Modalities             CP L knee       10'

## 2024-05-30 ENCOUNTER — APPOINTMENT (OUTPATIENT)
Dept: PHYSICAL THERAPY | Facility: CLINIC | Age: 34
End: 2024-05-30
Payer: COMMERCIAL

## 2024-06-03 ENCOUNTER — APPOINTMENT (OUTPATIENT)
Dept: PHYSICAL THERAPY | Facility: CLINIC | Age: 34
End: 2024-06-03
Payer: COMMERCIAL

## 2024-06-03 DIAGNOSIS — S82.832A CLOSED FRACTURE OF PROXIMAL END OF LEFT FIBULA, UNSPECIFIED FRACTURE MORPHOLOGY, INITIAL ENCOUNTER: Primary | ICD-10-CM

## 2024-06-04 ENCOUNTER — OFFICE VISIT (OUTPATIENT)
Dept: OBGYN CLINIC | Facility: CLINIC | Age: 34
End: 2024-06-04
Payer: COMMERCIAL

## 2024-06-04 ENCOUNTER — APPOINTMENT (OUTPATIENT)
Dept: RADIOLOGY | Facility: CLINIC | Age: 34
End: 2024-06-04
Payer: COMMERCIAL

## 2024-06-04 VITALS — BODY MASS INDEX: 39.7 KG/M2 | WEIGHT: 210.3 LBS | HEIGHT: 61 IN

## 2024-06-04 DIAGNOSIS — S82.832A CLOSED FRACTURE OF PROXIMAL END OF LEFT FIBULA, UNSPECIFIED FRACTURE MORPHOLOGY, INITIAL ENCOUNTER: ICD-10-CM

## 2024-06-04 DIAGNOSIS — S82.832D CLOSED FRACTURE OF PROXIMAL END OF LEFT FIBULA WITH ROUTINE HEALING, UNSPECIFIED FRACTURE MORPHOLOGY, SUBSEQUENT ENCOUNTER: Primary | ICD-10-CM

## 2024-06-04 PROCEDURE — 99213 OFFICE O/P EST LOW 20 MIN: CPT | Performed by: ORTHOPAEDIC SURGERY

## 2024-06-04 PROCEDURE — 73560 X-RAY EXAM OF KNEE 1 OR 2: CPT

## 2024-06-04 NOTE — LETTER
June 4, 2024     Patient: Juan Brown  YOB: 1990  Date of Visit: 6/4/2024      To Whom it May Concern:    Juan Brown is under my professional care. Juan was seen in my office on 6/4/2024. Juan is out of work at this time. She will be re-evaluated in 4 weeks time.     If you have any questions or concerns, please don't hesitate to call.         Sincerely,          Campbell Lieberman MD        C

## 2024-06-04 NOTE — PROGRESS NOTES
Orthopaedics Office Visit - Established Patient Visit    ASSESSMENT/PLAN:    Assessment:   10.5 weeks s/p closed treatment of a left comminuted fibular head fracture   DOI 3/21/24   Near complete resolution of pain     Still making gains in strength    Plan:   X-rays were performed in the office and reviewed  Continue strengthening exercises with PT, updated script was provided  Out of work at this time, note was provided, discussed returning to work on approx. 4 weeks time  Follow up in 4 weeks time with left knee x-rays      To Do Next Visit:  X-ray left knee , goal is to be able to run to start new job    _____________________________________________________  CHIEF COMPLAINT:  Chief Complaint   Patient presents with    Left Knee - Follow-up         SUBJECTIVE:  Juan Brown is a 34 y.o. female who presents to the office for a follow up regarding her left knee. Overall Juan is doing well. She will wear the hinged knee brace when out of the house for support as it does feel like her knee is going to give out at times. She recently started strengthening exercises with PT. She is still unable to run due to her knee buckling. She notes intermittent edema to the knee and feels this is related to incased therapy exercises. She denies much pain. She notes some throbbing pain at night, which will resolve with Ibuprofen.     PAST MEDICAL HISTORY:  History reviewed. No pertinent past medical history.    PAST SURGICAL HISTORY:  Past Surgical History:   Procedure Laterality Date     SECTION         FAMILY HISTORY:  History reviewed. No pertinent family history.    SOCIAL HISTORY:  Social History     Tobacco Use    Smoking status: Never   Vaping Use    Vaping status: Never Used   Substance Use Topics    Alcohol use: Not Currently    Drug use: Not Currently       MEDICATIONS:    Current Outpatient Medications:     acetaminophen (TYLENOL) 325 mg tablet, Take 2 tablets (650 mg total) by mouth every 6 (six) hours as  "needed for mild pain, Disp: 40 tablet, Rfl: 0    ibuprofen (MOTRIN) 600 mg tablet, Take 1 tablet (600 mg total) by mouth every 8 (eight) hours as needed for moderate pain, Disp: 20 tablet, Rfl: 0    methylPREDNISolone 4 MG tablet therapy pack, Use as directed on package, Disp: 1 each, Rfl: 0    oxyCODONE (Roxicodone) 5 immediate release tablet, Take 1 tablet (5 mg total) by mouth every 6 (six) hours as needed for moderate pain Max Daily Amount: 20 mg, Disp: 20 tablet, Rfl: 0    lidocaine (Lidoderm) 5 %, Apply 1 patch topically over 12 hours daily Remove & Discard patch within 12 hours or as directed by MD, Disp: 5 patch, Rfl: 0    ALLERGIES:  No Known Allergies    REVIEW OF SYSTEMS:  MSK: as noted in HPI  Neuro: WNL's  Pertinent items are otherwise noted in HPI.  A comprehensive review of systems was otherwise negative.    LABS:  HgA1c: No results found for: \"HGBA1C\"  BMP:   Lab Results   Component Value Date    CALCIUM 9.2 04/06/2023    K 3.7 04/06/2023    CO2 26 04/06/2023     04/06/2023    BUN 15 04/06/2023    CREATININE 0.61 04/06/2023     CBC: No components found for: \"CBC\"    _____________________________________________________  PHYSICAL EXAMINATION:  Vital signs: Ht 5' 1\" (1.549 m)   Wt 95.4 kg (210 lb 4.8 oz)   BMI 39.74 kg/m²   General: No acute distress, awake and alert  Psychiatric: Mood and affect appear appropriate  HEENT: Trachea Midline, No torticollis, no apparent facial trauma  Cardiovascular: No audible murmurs; Extremities appear perfused  Pulmonary: No audible wheezing or stridor  Skin: No open lesions; see further details (if any) below    MUSCULOSKELETAL EXAMINATION:    Extremities:  Left knee     No erythema, ecchymosis or edema  Non tender to palpation over fracture site   Knee ROM 0-100 degrees   Ambulates without assistance  Extremity appears warm and well perfused     _____________________________________________________  STUDIES REVIEWED:  I personally reviewed the images and " interpretation is as follows:  X-rays of the left knee demonstrate a healing proximal fibula fracture.        PROCEDURES PERFORMED:  Procedures    Scribe Attestation      I,:  Kathie Woodward am acting as a scribe while in the presence of the attending physician.:       I,:  Campbell Lieberman MD personally performed the services described in this documentation    as scribed in my presence.:

## 2024-06-05 ENCOUNTER — APPOINTMENT (OUTPATIENT)
Dept: PHYSICAL THERAPY | Facility: CLINIC | Age: 34
End: 2024-06-05
Payer: COMMERCIAL

## 2024-06-10 ENCOUNTER — OFFICE VISIT (OUTPATIENT)
Dept: PHYSICAL THERAPY | Facility: CLINIC | Age: 34
End: 2024-06-10
Payer: COMMERCIAL

## 2024-06-10 DIAGNOSIS — S82.832D CLOSED FRACTURE OF PROXIMAL END OF LEFT FIBULA WITH ROUTINE HEALING, UNSPECIFIED FRACTURE MORPHOLOGY, SUBSEQUENT ENCOUNTER: Primary | ICD-10-CM

## 2024-06-10 PROCEDURE — 97140 MANUAL THERAPY 1/> REGIONS: CPT | Performed by: PHYSICAL THERAPIST

## 2024-06-10 PROCEDURE — 97110 THERAPEUTIC EXERCISES: CPT | Performed by: PHYSICAL THERAPIST

## 2024-06-10 NOTE — PROGRESS NOTES
PT Re-evaluation     Today's date: 6/10/2024  Patient name: Juan Brown  : 1990  MRN: [id number]  Referring provider: Willian Soliz PA*  Dx:   Encounter Diagnosis     ICD-10-CM    1. Closed fracture of proximal end of left fibula, unspecified fracture morphology, initial encounter  S82.832A Ambulatory Referral to Physical Therapy                     Assessment  Assessment details: Pt is a 33 y/o female who presents to physical therapy with primary nociceptive pain s/p slip and fall with subsequent proximal fibular head fracture. Pt does not present with any red flag symptoms at this time. Pt continues to make improvement with PT in subjective and objective measurements. She continues to have some lingering pain in the lateral lower limb with specific motions. Able to make good improvement today with mobilization of the fibular head not only in the lateral lower limb pain, but also the low back pain. Will assess response at next follow-up. Pt would continue to benefit from skilled physical therapy in order to decrease deficits and return to prior level of function.    Impairments: abnormal gait, abnormal or restricted ROM, activity intolerance, impaired physical strength and pain with function    Symptom irritability: highUnderstanding of Dx/Px/POC: good  Goals  STG (4 weeks): - MET  Pt will be independent with HEP.  Pt will demonstrate increase in flexion ROM >120d.  Pt will demonstrate no effusion.  Pt will demonstrate SLR with no extension lag.    LTG (8 weeks): - Ongoing  FOTO will be expected outcome.   Pt will demonstrate L knee ROM comparable to contralateral limb.  Pt will demonstrate MMT grade comparable to contralateral limb in all deficient muscle groups.  Pt will demonstrate ability to ambulate on level surface without AD and no assistance for 150ft.      Plan  Patient would benefit from: skilled physical therapy  Planned modality interventions: cryotherapy  Planned therapy interventions:  manual therapy, neuromuscular re-education, patient education, self care, strengthening, stretching, therapeutic activities, therapeutic exercise and home exercise program  Frequency: 1-2x/week.  Duration in weeks: 6  Treatment plan discussed with: patient    Subjective Evaluation    History of Present Illness  Mechanism of injury: Chief Complaint: Pt reports on 3/21/24, she slipped and fell on a freshly waxed floor while chasing after her daughter. She felt immediate pain and was taken to the ED. Radiographs confirmed avulsion fracture of the proximal fibular head. She saw Dr. Lieberman  after and was given a TROM brace that is unlocked. She reports she is having difficulty keeping the swelling down at this time and she still has a significant amount of pain.     Severity: severe  Irritability: high  Nature: nociceptive fracture  Stage: subacute  Stability: improving    P1: see body chart  Patient Goals  Patient goal: return to work, be able to walk without pain      Objective     Observations   Left Knee   Positive for edema and effusion.     Active Range of Motion   Left Knee   Flexion: 113 degrees   Extension: 0 degrees     Right Knee   Normal active range of motion    Passive Range of Motion     L knee flexion 120d      L fibular head mobility: hypomobile     (-) SLR for peroneal, (+) DF    (-) Slump with peroneal bias    Painful joint mobilization lumbar spine    Squat: 70d prior to stopping due to fear of going too far    Lumbar ROM:  FB: WNL  BB: WNL  L SB: 50% pain in the lumbar spine and unable to go farther or L knee would bend  R SB: WNL     Precautions: none    POC expires Unit limit Auth Expiration date PT/OT/ST + Visit Limit?   6/3/24                              Visit/Unit Tracking  AUTH Status:  Date 4/8               Used 1               Remaining                  Manuals 4/8 4/11 4/18 4/25 5/2 5/22 5/28 6/10     L fibular head mobs        PA and AP mobs gd 2-4 TANISHA     Lumbar jt mobs        CPA TLJ TANISHA  "gd 2-2+                               Neuro Re-Ed             Quad set  20x3\" 5\"x20   5\"x20       Glute set                                                                              Ther Ex             Knee PROM TANISHA 5' Tanisha 15' AF TANISHA 15' AF  SA AF      SLR   unable 20x x20 x20 2x10      Sidelying hip abd      20x x20      Heel slide  20x x20  x20 x20       SAQ   x10 X20  x20 x20                    Bike  6' partial ROM 5' partial ROM 5' partial ROM 5' partial/full ROM 5' partial/full ROM 5' partial/full ROM       Mini squat       2x10      Leg press       65# 2x12      Pt edu TANISHA  AF  AF  AF      Ther Activity             Step up       6\" x20      Retro walk c [place]       10# x10      Gait Training             Crutches TANISHA                         Modalities             CP L knee       10'                            "

## 2024-06-12 ENCOUNTER — APPOINTMENT (OUTPATIENT)
Dept: PHYSICAL THERAPY | Facility: CLINIC | Age: 34
End: 2024-06-12
Payer: COMMERCIAL

## 2024-06-14 ENCOUNTER — OFFICE VISIT (OUTPATIENT)
Dept: PHYSICAL THERAPY | Facility: CLINIC | Age: 34
End: 2024-06-14
Payer: COMMERCIAL

## 2024-06-14 DIAGNOSIS — S82.832D CLOSED FRACTURE OF PROXIMAL END OF LEFT FIBULA WITH ROUTINE HEALING, UNSPECIFIED FRACTURE MORPHOLOGY, SUBSEQUENT ENCOUNTER: Primary | ICD-10-CM

## 2024-06-14 PROCEDURE — 97140 MANUAL THERAPY 1/> REGIONS: CPT | Performed by: PHYSICAL THERAPIST

## 2024-06-14 PROCEDURE — 97530 THERAPEUTIC ACTIVITIES: CPT | Performed by: PHYSICAL THERAPIST

## 2024-06-14 PROCEDURE — 97110 THERAPEUTIC EXERCISES: CPT | Performed by: PHYSICAL THERAPIST

## 2024-06-14 NOTE — PROGRESS NOTES
"Daily Note     Today's date: 2024  Patient name: Juan Brown  : 1990  MRN: [id number]  Referring provider: Willian Soliz PA*  Dx:   Encounter Diagnosis     ICD-10-CM    1. Closed fracture of proximal end of left fibula with routine healing, unspecified fracture morphology, subsequent encounter  S82.103Z                      Subjective: Pt reports that she is now only having pain when going to run or after sitting for a long time.      Objective: See treatment diary below      Assessment: Tolerated treatment well. No pain with all testing of the knee, low back, or neurodynamics. Poor motor control with leg press. Metronome improved. Discussion on POC moving forward in regards to strengthening program. Patient would benefit from continued PT      Plan: Continue per plan of care.  Progress treatment as tolerated.       Precautions: none    POC expires Unit limit Auth Expiration date PT/OT/ST + Visit Limit?   6/3/24                              Visit/Unit Tracking  AUTH Status:  Date          Approved - 12 visits Used 1 2 3 4 5 6          Remaining  11 10 9 8 7 6             Manuals  5/2 5/22 5/28 6/10 6/14    L fibular head mobs        PA and AP mobs gd 2-4 TANISHA     Lumbar jt mobs        CPA TLJ TANISHA gd 2-2+     Assessment         TANISHA                 Neuro Re-Ed             Quad set  20x3\" 5\"x20   5\"x20       Glute set                                                                              Ther Ex             Knee PROM TANISHA 5' Tanisha 15' AF TANISHA 15' AF  SA AF      SLR   unable 20x x20 x20 2x10      Sidelying hip abd      20x x20      Heel slide  20x x20  x20 x20       SAQ   x10 X20  x20 x20                    Bike  6' partial ROM 5' partial ROM 5' partial ROM 5' partial/full ROM 5' partial/full ROM 5' partial/full ROM       Mini squat       2x10      Leg press       65# 2x12  45# 2x7 3/4 met    Pt edu TANISHA  AF  AF  AF      Ther Activity             Step up       6\" " "x20  6\" 2x10 fast on the way up    STS (fast paced)             Retro walk c [place]       10# x10  12.5# x10    Gait Training             Crutches TANISHA                         Modalities             CP L knee       10'                                      "

## 2024-06-17 ENCOUNTER — APPOINTMENT (OUTPATIENT)
Dept: PHYSICAL THERAPY | Facility: CLINIC | Age: 34
End: 2024-06-17
Payer: COMMERCIAL

## 2024-06-19 ENCOUNTER — APPOINTMENT (OUTPATIENT)
Dept: PHYSICAL THERAPY | Facility: CLINIC | Age: 34
End: 2024-06-19
Payer: COMMERCIAL

## 2024-06-24 ENCOUNTER — OFFICE VISIT (OUTPATIENT)
Dept: PHYSICAL THERAPY | Facility: CLINIC | Age: 34
End: 2024-06-24
Payer: COMMERCIAL

## 2024-06-24 DIAGNOSIS — S82.832D CLOSED FRACTURE OF PROXIMAL END OF LEFT FIBULA WITH ROUTINE HEALING, UNSPECIFIED FRACTURE MORPHOLOGY, SUBSEQUENT ENCOUNTER: Primary | ICD-10-CM

## 2024-06-24 PROCEDURE — 97530 THERAPEUTIC ACTIVITIES: CPT | Performed by: PHYSICAL THERAPIST

## 2024-06-24 PROCEDURE — 97110 THERAPEUTIC EXERCISES: CPT | Performed by: PHYSICAL THERAPIST

## 2024-06-24 NOTE — PROGRESS NOTES
"Daily Note     Today's date: 2024  Patient name: Juan Brown  : 1990  MRN: [id number]  Referring provider: Willian Soliz PA*  Dx:   Encounter Diagnosis     ICD-10-CM    1. Closed fracture of proximal end of left fibula with routine healing, unspecified fracture morphology, subsequent encounter  S82.762D                      Subjective: Pt reports that she has returned to jogging over the weekend. She states that she has some swelling in the L ankle from this and she feels at night that she needs to try and crack it. However, she reports that cracking doesn't give her relief, instead it is slightly painful. She also reports that       Objective: See treatment diary below      Assessment: Tolerated treatment well. Assessed ankle. Tendon over bone for cracking, no loss of strength or ROM. Educated pt on trying to not make it crack for now. Discussed normal Improving strength. Patient would benefit from continued PT      Plan: Continue per plan of care.  Progress treatment as tolerated.       Precautions: none    POC expires Unit limit Auth Expiration date PT/OT/ST + Visit Limit?   6/3/24                              Visit/Unit Tracking  AUTH Status:  Date          Approved - 12 visits Used 1 2 3 4 5 6          Remaining  11 10 9 8 7 6             Manuals 4/8 4/11 4/18 4/25 5/2 5/22 5/28 6/10 6/14 6/24   L fibular head mobs        PA and AP mobs gd 2-4 TANISHA     Lumbar jt mobs        CPA TLJ TANISHA gd 2-2+     Assessment         TANISHA                 Neuro Re-Ed             Quad set  20x3\" 5\"x20   5\"x20       Glute set                                                                              Ther Ex             Knee PROM TANISHA 5' Tanisha 15' AF TANISHA 15' AF  SA AF      SLR   unable 20x x20 x20 2x10      Sidelying hip abd      20x x20      Heel slide  20x x20  x20 x20       SAQ   x10 X20  x20 x20                    Bike  6' partial ROM 5' partial ROM 5' partial ROM 5' partial/full ROM 5' " "partial/full ROM 5' partial/full ROM    5' full ROM   Mini squat       2x10      Leg press       65# 2x12  45# 2x7 3/4 met 45,55# 3x14,10, 3/4 met   Pt edu TANISHA  AF  AF  AF   TANISHA   Ther Activity             Step up       6\" x20  6\" 2x10 fast on the way up Biodex 2x15 fast on conc   STS (fast paced)             Retro walk c [place]       10# x10  12.5# x10 13# 20x   Gait Training             Crutches TANISHA                         Modalities             CP L knee       10'                                        "

## 2024-06-26 ENCOUNTER — OFFICE VISIT (OUTPATIENT)
Dept: PHYSICAL THERAPY | Facility: CLINIC | Age: 34
End: 2024-06-26
Payer: COMMERCIAL

## 2024-06-26 DIAGNOSIS — S82.832D CLOSED FRACTURE OF PROXIMAL END OF LEFT FIBULA WITH ROUTINE HEALING, UNSPECIFIED FRACTURE MORPHOLOGY, SUBSEQUENT ENCOUNTER: Primary | ICD-10-CM

## 2024-06-26 PROCEDURE — 97530 THERAPEUTIC ACTIVITIES: CPT | Performed by: PHYSICAL THERAPIST

## 2024-06-26 PROCEDURE — 97110 THERAPEUTIC EXERCISES: CPT | Performed by: PHYSICAL THERAPIST

## 2024-06-26 NOTE — PROGRESS NOTES
PT Discharge     Today's date: 2024  Patient name: Juan Brown  : 1990  MRN: [id number]  Referring provider: Willian Soliz PA*  Dx:   Encounter Diagnosis     ICD-10-CM    1. Closed fracture of proximal end of left fibula, unspecified fracture morphology, initial encounter  S82.832A Ambulatory Referral to Physical Therapy                     Assessment  Assessment details: Pt is a 35 y/o female who presents to physical therapy with primary nociceptive pain s/p slip and fall with subsequent proximal fibular head fracture. Pt does not present with any red flag symptoms at this time. Pt presents with high severity of pain, reduced knee ROM, abnormal gait, and swelling. Pt has made significant progress in objective and subjective measurements. Her strength is significantly improved. She has no ROM deficits. She currently only has some slight difficulty with running, but is able to. PT and pt discussed d/c, pt agreeable.     Impairments: abnormal gait, abnormal or restricted ROM, activity intolerance, impaired physical strength and pain with function    Symptom irritability: highUnderstanding of Dx/Px/POC: good  Goals  STG (4 weeks): - MET  Pt will be independent with HEP.  Pt will demonstrate increase in flexion ROM >120d.  Pt will demonstrate no effusion.  Pt will demonstrate SLR with no extension lag.    LTG (8 weeks): - MET  FOTO will be expected outcome.   Pt will demonstrate L knee ROM comparable to contralateral limb.  Pt will demonstrate MMT grade comparable to contralateral limb in all deficient muscle groups.  Pt will demonstrate ability to ambulate on level surface without AD and no assistance for 150ft.      Plan: d/c    Subjective Evaluation    History of Present Illness  Mechanism of injury: Chief Complaint: Pt reports on 3/21/24, she slipped and fell on a freshly waxed floor while chasing after her daughter. She felt immediate pain and was taken to the ED. Radiographs confirmed avulsion  "fracture of the proximal fibular head. She saw Dr. Lieberman  after and was given a TROM brace that is unlocked. She reports she is having difficulty keeping the swelling down at this time and she still has a significant amount of pain.     Severity: severe  Irritability: high  Nature: nociceptive fracture  Stage: subacute  Stability: improving    P1: see body chart  Patient Goals  Patient goal: return to work, be able to walk without pain      Objective     Observations   Left Knee   Positive for edema and effusion.     Active Range of Motion   Left Knee   Flexion: 127 degrees   Extension: 0 degrees     Right Knee   Normal active range of motion    Passive Range of Motion   WNL    Strength  WNL           Precautions: none    POC expires Unit limit Auth Expiration date PT/OT/ST + Visit Limit?   6/3/24                              Visit/Unit Tracking  AUTH Status:  Date 4/8               Used 1               Remaining                  Manuals 6/26 4/11 4/18 4/25 5/2 5/22 5/28 6/10 6/14 6/24   L fibular head mobs        PA and AP mobs gd 2-4 TANISHA     Lumbar jt mobs        CPA TLJ TANISHA gd 2-2+     Assessment         TANISHA                 Neuro Re-Ed             Quad set  20x3\" 5\"x20   5\"x20       Glute set                                                                              Ther Ex             Knee PROM  Tanisha 15' AF TANISHA 15' AF  SA AF      SLR   unable 20x x20 x20 2x10      Sidelying hip abd      20x x20      Heel slide  20x x20  x20 x20       SAQ   x10 X20  x20 x20                    Bike 5' full ROM 6' partial ROM 5' partial ROM 5' partial ROM 5' partial/full ROM 5' partial/full ROM 5' partial/full ROM    5' full ROM   Mini squat       2x10      Leg press 55# 3x12      65# 2x12  45# 2x7 3/4 met 45,55# 3x14,10, 3/4 met   Pt edu   AF  AF  AF   TANISHA   Ther Activity             Step up Biodex 2x15 fast on conc      6\" x20  6\" 2x10 fast on the way up Biodex 2x15 fast on conc   STS (fast paced)             Retro walking treadmill " 5x1'            Retro walk braulio Dejesus       10# x10  12.5# x10 13# 20x   Gait Training             Crutches                          Modalities             CP L knee       10'

## 2024-07-14 DIAGNOSIS — S82.832D CLOSED FRACTURE OF PROXIMAL END OF LEFT FIBULA WITH ROUTINE HEALING, UNSPECIFIED FRACTURE MORPHOLOGY, SUBSEQUENT ENCOUNTER: Primary | ICD-10-CM

## 2024-07-16 ENCOUNTER — OFFICE VISIT (OUTPATIENT)
Dept: OBGYN CLINIC | Facility: CLINIC | Age: 34
End: 2024-07-16
Payer: COMMERCIAL

## 2024-07-16 ENCOUNTER — APPOINTMENT (OUTPATIENT)
Dept: RADIOLOGY | Facility: CLINIC | Age: 34
End: 2024-07-16
Payer: COMMERCIAL

## 2024-07-16 VITALS
BODY MASS INDEX: 37.49 KG/M2 | HEIGHT: 61 IN | HEART RATE: 82 BPM | DIASTOLIC BLOOD PRESSURE: 80 MMHG | SYSTOLIC BLOOD PRESSURE: 119 MMHG | WEIGHT: 198.6 LBS

## 2024-07-16 DIAGNOSIS — S82.832A CLOSED FRACTURE OF PROXIMAL END OF LEFT FIBULA, UNSPECIFIED FRACTURE MORPHOLOGY, INITIAL ENCOUNTER: Primary | ICD-10-CM

## 2024-07-16 DIAGNOSIS — S82.832D CLOSED FRACTURE OF PROXIMAL END OF LEFT FIBULA WITH ROUTINE HEALING, UNSPECIFIED FRACTURE MORPHOLOGY, SUBSEQUENT ENCOUNTER: ICD-10-CM

## 2024-07-16 PROCEDURE — 73560 X-RAY EXAM OF KNEE 1 OR 2: CPT

## 2024-07-16 PROCEDURE — 99213 OFFICE O/P EST LOW 20 MIN: CPT | Performed by: ORTHOPAEDIC SURGERY

## 2024-07-16 NOTE — PROGRESS NOTES
Orthopaedics Office Visit - Follow-Up Patient Visit    ASSESSMENT/PLAN:    Assessment:   4 months s/p left comminuted fibular fracture, DOI: 3/21/2024   Bony union  Now able to run, improved strength with therapy/exercises    Plan:   Reviewed physical exam and imaging with patient at time of visit.   Continue weightbearing activities.   The patient was provided a note to return to unrestricted work activity.   She will follow-up in 2 months for re-evaluation.     To Do Next Visit:  Re-evaluation    _____________________________________________________  CHIEF COMPLAINT:  Chief Complaint   Patient presents with    Left Knee - Follow-up         SUBJECTIVE:  Juan Brown is a 34 y.o. female who presents for follow-up evaluation of her left lower leg. The patient is approximately 4 months s/p proximal fibular fracture. The patient has some mild occasional soreness however minimal pain at baseline. Pain controlled when oral medications used. Denies any new injuries.    PAST MEDICAL HISTORY:  History reviewed. No pertinent past medical history.    PAST SURGICAL HISTORY:  Past Surgical History:   Procedure Laterality Date     SECTION         FAMILY HISTORY:  History reviewed. No pertinent family history.    SOCIAL HISTORY:  Social History     Tobacco Use    Smoking status: Never   Vaping Use    Vaping status: Never Used   Substance Use Topics    Alcohol use: Not Currently    Drug use: Not Currently       MEDICATIONS:    Current Outpatient Medications:     acetaminophen (TYLENOL) 325 mg tablet, Take 2 tablets (650 mg total) by mouth every 6 (six) hours as needed for mild pain, Disp: 40 tablet, Rfl: 0    ibuprofen (MOTRIN) 600 mg tablet, Take 1 tablet (600 mg total) by mouth every 8 (eight) hours as needed for moderate pain, Disp: 20 tablet, Rfl: 0    methylPREDNISolone 4 MG tablet therapy pack, Use as directed on package, Disp: 1 each, Rfl: 0    oxyCODONE (Roxicodone) 5 immediate release tablet, Take 1 tablet (5 mg  "total) by mouth every 6 (six) hours as needed for moderate pain Max Daily Amount: 20 mg, Disp: 20 tablet, Rfl: 0    lidocaine (Lidoderm) 5 %, Apply 1 patch topically over 12 hours daily Remove & Discard patch within 12 hours or as directed by MD, Disp: 5 patch, Rfl: 0    ALLERGIES:  No Known Allergies    REVIEW OF SYSTEMS:  MSK: As noted in HPI.  Neuro: WNL.  Pertinent items are otherwise noted in HPI.  A comprehensive review of systems was otherwise negative.    LABS:  HgA1c: No results found for: \"HGBA1C\"  BMP:   Lab Results   Component Value Date    CALCIUM 9.2 04/06/2023    K 3.7 04/06/2023    CO2 26 04/06/2023     04/06/2023    BUN 15 04/06/2023    CREATININE 0.61 04/06/2023     CBC: No components found for: \"CBC\"    _____________________________________________________  PHYSICAL EXAMINATION:  Vital signs: /80   Pulse 82   Ht 5' 1\" (1.549 m)   Wt 90.1 kg (198 lb 9.6 oz)   BMI 37.53 kg/m²   General: No acute distress, awake and alert  Psychiatric: Mood and affect appear appropriate  HEENT: Trachea Midline, No torticollis, no apparent facial trauma  Cardiovascular: No audible murmurs; Extremities appear perfused  Pulmonary: No audible wheezing or stridor  Skin: No open lesions; see further details (if any) below    MUSCULOSKELETAL EXAMINATION:  Extremities:    left lower leg(s) -   Patient ambulates with steady gait pattern  Uses Crutches assistive device  No anatomical deformity  Skin is warm and dry to touch with no signs of erythema, ecchymosis, or infection   No soft tissue swelling or effusion noted  Knee ROM (0° - 120°)   Ankle ROM DF 10°, PF 50°, Inversion 30°, Eversion 5°  Strength: 5/5 throughout  No tenderness over the fibular head   Knee is stable to varus and valgus stress  Calf compartments are soft and supple  2+ DP and PT pulses with brisk capillary refill to the toes  Sural, saphenous, tibial, superficial, and deep peroneal motor and sensory distributions intact  Sensation light " touch intact distally      _____________________________________________________  STUDIES REVIEWED:  I personally reviewed the images and interpretation is as follows:    X-Ray of left knee obtained on 7/16/2024 were reviewed and demonstrate healed proximal fibular fracture.      PROCEDURES PERFORMED:  Procedures  None performed.       Scribe Attestation      I,:  Gianni Manzo am acting as a scribe while in the presence of the attending physician.:       I,:  Campbell Lieberman MD personally performed the services described in this documentation    as scribed in my presence.:

## 2024-07-16 NOTE — LETTER
July 16, 2024     Patient: Juan Brown  YOB: 1990  Date of Visit: 7/16/2024      To Whom it May Concern:    Juan Brown is under my professional care. Juan was seen in my office on 7/16/2024. Juan may return to work on 7/17/2024 without restrictions .    If you have any questions or concerns, please don't hesitate to call.         Sincerely,          Campbell Lieberman MD        CC: No Recipients

## 2024-08-21 ENCOUNTER — APPOINTMENT (EMERGENCY)
Dept: RADIOLOGY | Facility: HOSPITAL | Age: 34
End: 2024-08-21
Payer: COMMERCIAL

## 2024-08-21 ENCOUNTER — HOSPITAL ENCOUNTER (EMERGENCY)
Facility: HOSPITAL | Age: 34
Discharge: HOME | End: 2024-08-21
Attending: EMERGENCY MEDICINE
Payer: COMMERCIAL

## 2024-08-21 VITALS
SYSTOLIC BLOOD PRESSURE: 130 MMHG | DIASTOLIC BLOOD PRESSURE: 70 MMHG | OXYGEN SATURATION: 100 % | WEIGHT: 198 LBS | TEMPERATURE: 98.6 F | HEART RATE: 75 BPM | BODY MASS INDEX: 37.41 KG/M2 | RESPIRATION RATE: 18 BRPM

## 2024-08-21 DIAGNOSIS — M54.6 ACUTE LEFT-SIDED THORACIC BACK PAIN: ICD-10-CM

## 2024-08-21 DIAGNOSIS — R42 DIZZINESS: Primary | ICD-10-CM

## 2024-08-21 DIAGNOSIS — M79.661 PAIN OF RIGHT CALF: ICD-10-CM

## 2024-08-21 DIAGNOSIS — R42 LIGHTHEADEDNESS: ICD-10-CM

## 2024-08-21 LAB
ANION GAP SERPL CALCULATED.3IONS-SCNC: 6 MMOL/L (ref 4–13)
ATRIAL RATE: 70 BPM
BASOPHILS # BLD AUTO: 0.02 THOUSANDS/ÂΜL (ref 0–0.1)
BASOPHILS NFR BLD AUTO: 0 % (ref 0–1)
BUN SERPL-MCNC: 8 MG/DL (ref 5–25)
CALCIUM SERPL-MCNC: 9.2 MG/DL (ref 8.4–10.2)
CARDIAC TROPONIN I PNL SERPL HS: <2 NG/L
CHLORIDE SERPL-SCNC: 103 MMOL/L (ref 96–108)
CO2 SERPL-SCNC: 28 MMOL/L (ref 21–32)
CREAT SERPL-MCNC: 0.62 MG/DL (ref 0.6–1.3)
D DIMER PPP FEU-MCNC: <0.27 UG/ML FEU
EOSINOPHIL # BLD AUTO: 0.13 THOUSAND/ÂΜL (ref 0–0.61)
EOSINOPHIL NFR BLD AUTO: 2 % (ref 0–6)
ERYTHROCYTE [DISTWIDTH] IN BLOOD BY AUTOMATED COUNT: 12.6 % (ref 11.6–15.1)
GFR SERPL CREATININE-BSD FRML MDRD: 118 ML/MIN/1.73SQ M
GLUCOSE SERPL-MCNC: 94 MG/DL (ref 65–140)
HCT VFR BLD AUTO: 35.6 % (ref 34.8–46.1)
HGB BLD-MCNC: 12.3 G/DL (ref 11.5–15.4)
IMM GRANULOCYTES # BLD AUTO: 0.02 THOUSAND/UL (ref 0–0.2)
IMM GRANULOCYTES NFR BLD AUTO: 0 % (ref 0–2)
LYMPHOCYTES # BLD AUTO: 1.76 THOUSANDS/ÂΜL (ref 0.6–4.47)
LYMPHOCYTES NFR BLD AUTO: 24 % (ref 14–44)
MCH RBC QN AUTO: 30 PG (ref 26.8–34.3)
MCHC RBC AUTO-ENTMCNC: 34.6 G/DL (ref 31.4–37.4)
MCV RBC AUTO: 87 FL (ref 82–98)
MONOCYTES # BLD AUTO: 0.49 THOUSAND/ÂΜL (ref 0.17–1.22)
MONOCYTES NFR BLD AUTO: 7 % (ref 4–12)
NEUTROPHILS # BLD AUTO: 5.06 THOUSANDS/ÂΜL (ref 1.85–7.62)
NEUTS SEG NFR BLD AUTO: 67 % (ref 43–75)
NRBC BLD AUTO-RTO: 0 /100 WBCS
P AXIS: 45 DEGREES
PLATELET # BLD AUTO: 287 THOUSANDS/UL (ref 149–390)
PMV BLD AUTO: 9.4 FL (ref 8.9–12.7)
POTASSIUM SERPL-SCNC: 3.6 MMOL/L (ref 3.5–5.3)
PR INTERVAL: 150 MS
QRS AXIS: 86 DEGREES
QRSD INTERVAL: 84 MS
QT INTERVAL: 384 MS
QTC INTERVAL: 414 MS
RBC # BLD AUTO: 4.1 MILLION/UL (ref 3.81–5.12)
SODIUM SERPL-SCNC: 137 MMOL/L (ref 135–147)
T WAVE AXIS: 61 DEGREES
VENTRICULAR RATE: 70 BPM
WBC # BLD AUTO: 7.48 THOUSAND/UL (ref 4.31–10.16)

## 2024-08-21 PROCEDURE — 96374 THER/PROPH/DIAG INJ IV PUSH: CPT

## 2024-08-21 PROCEDURE — 84484 ASSAY OF TROPONIN QUANT: CPT | Performed by: EMERGENCY MEDICINE

## 2024-08-21 PROCEDURE — 71046 X-RAY EXAM CHEST 2 VIEWS: CPT

## 2024-08-21 PROCEDURE — 99284 EMERGENCY DEPT VISIT MOD MDM: CPT | Performed by: EMERGENCY MEDICINE

## 2024-08-21 PROCEDURE — 80048 BASIC METABOLIC PNL TOTAL CA: CPT | Performed by: EMERGENCY MEDICINE

## 2024-08-21 PROCEDURE — 85379 FIBRIN DEGRADATION QUANT: CPT | Performed by: EMERGENCY MEDICINE

## 2024-08-21 PROCEDURE — 99284 EMERGENCY DEPT VISIT MOD MDM: CPT

## 2024-08-21 PROCEDURE — 93005 ELECTROCARDIOGRAM TRACING: CPT

## 2024-08-21 PROCEDURE — 96361 HYDRATE IV INFUSION ADD-ON: CPT

## 2024-08-21 PROCEDURE — 96375 TX/PRO/DX INJ NEW DRUG ADDON: CPT

## 2024-08-21 PROCEDURE — 93010 ELECTROCARDIOGRAM REPORT: CPT | Performed by: INTERNAL MEDICINE

## 2024-08-21 PROCEDURE — 36415 COLL VENOUS BLD VENIPUNCTURE: CPT | Performed by: EMERGENCY MEDICINE

## 2024-08-21 PROCEDURE — 85025 COMPLETE CBC W/AUTO DIFF WBC: CPT | Performed by: EMERGENCY MEDICINE

## 2024-08-21 RX ORDER — MECLIZINE HYDROCHLORIDE 25 MG/1
25 TABLET ORAL 3 TIMES DAILY PRN
Qty: 20 TABLET | Refills: 0 | Status: SHIPPED | OUTPATIENT
Start: 2024-08-21

## 2024-08-21 RX ORDER — METOCLOPRAMIDE HYDROCHLORIDE 5 MG/ML
10 INJECTION INTRAMUSCULAR; INTRAVENOUS ONCE
Status: COMPLETED | OUTPATIENT
Start: 2024-08-21 | End: 2024-08-21

## 2024-08-21 RX ORDER — MECLIZINE HYDROCHLORIDE 25 MG/1
25 TABLET ORAL ONCE
Status: COMPLETED | OUTPATIENT
Start: 2024-08-21 | End: 2024-08-21

## 2024-08-21 RX ORDER — ACETAMINOPHEN 325 MG/1
975 TABLET ORAL ONCE
Status: COMPLETED | OUTPATIENT
Start: 2024-08-21 | End: 2024-08-21

## 2024-08-21 RX ORDER — KETOROLAC TROMETHAMINE 30 MG/ML
15 INJECTION, SOLUTION INTRAMUSCULAR; INTRAVENOUS ONCE
Status: COMPLETED | OUTPATIENT
Start: 2024-08-21 | End: 2024-08-21

## 2024-08-21 RX ADMIN — METOCLOPRAMIDE 10 MG: 5 INJECTION, SOLUTION INTRAMUSCULAR; INTRAVENOUS at 13:42

## 2024-08-21 RX ADMIN — KETOROLAC TROMETHAMINE 15 MG: 30 INJECTION, SOLUTION INTRAMUSCULAR; INTRAVENOUS at 13:42

## 2024-08-21 RX ADMIN — SODIUM CHLORIDE 1000 ML: 0.9 INJECTION, SOLUTION INTRAVENOUS at 13:42

## 2024-08-21 RX ADMIN — MECLIZINE HYDROCHLORIDE 25 MG: 25 TABLET ORAL at 13:33

## 2024-08-21 RX ADMIN — ACETAMINOPHEN 975 MG: 325 TABLET, FILM COATED ORAL at 13:32

## 2024-08-21 NOTE — DISCHARGE INSTRUCTIONS
Please follow up PCP.  Recommend meclizine every 8 hours for the next 2 days and then please use as directed.  Recommend tylenol 650 mg and ibuprofen 600 mg every 6 hours as needed for pain. Please return for severe chest pain, significant shortness of breath, severely worsening symptoms, or any other concerning signs or symptoms. Please refer to the following documents for additional instructions and return precautions.

## 2024-08-21 NOTE — Clinical Note
Juan Brown was seen and treated in our emergency department on 8/21/2024.                Diagnosis: dizziness    Juan  may return to work on return date.    She may return on this date: 08/23/2024         If you have any questions or concerns, please don't hesitate to call.      Rk Malone MD    ______________________________           _______________          _______________  Hospital Representative                              Date                                Time

## 2024-08-21 NOTE — ED PROVIDER NOTES
History  Chief Complaint   Patient presents with    Dizziness     Pt reports back pain in upper back that began 5 days ago, intermittent SOB over weekend, and this am became dizzy and fuzzy while driving.     Leg Pain     Also reports right calf pain that began yest evening      34-year-old female no significant reported past history presenting with multiple complaints.  Patient reports pain inside her left shoulder blade ongoing for last 5 days.  Reports insidious onset.  Denies any known injury.  Denies any chest pain shortness of breath.  Denies abdominal pain nausea vomiting diarrhea.  Patient reports earlier today she began feeling lightheaded and having wrist pain dizziness.  Denies any known inciting event.  Worse with positional changes.  Does report recent sinus infection that she is still recovering from.  Denies any ear pain or pressure.  Also reports recent right posterior calf pain.  Denies any other complaints.  Chart reviewed.    History reviewed. No pertinent past medical history.  Family History: non-contributory  Social History          Prior to Admission Medications   Prescriptions Last Dose Informant Patient Reported? Taking?   acetaminophen (TYLENOL) 325 mg tablet   No No   Sig: Take 2 tablets (650 mg total) by mouth every 6 (six) hours as needed for mild pain   ibuprofen (MOTRIN) 600 mg tablet   No No   Sig: Take 1 tablet (600 mg total) by mouth every 8 (eight) hours as needed for moderate pain   lidocaine (Lidoderm) 5 %   No No   Sig: Apply 1 patch topically over 12 hours daily Remove & Discard patch within 12 hours or as directed by MD   methylPREDNISolone 4 MG tablet therapy pack   No No   Sig: Use as directed on package   oxyCODONE (Roxicodone) 5 immediate release tablet   No No   Sig: Take 1 tablet (5 mg total) by mouth every 6 (six) hours as needed for moderate pain Max Daily Amount: 20 mg      Facility-Administered Medications: None       History reviewed. No pertinent past medical  history.    Past Surgical History:   Procedure Laterality Date     SECTION         History reviewed. No pertinent family history.  I have reviewed and agree with the history as documented.    E-Cigarette/Vaping    E-Cigarette Use Never User      E-Cigarette/Vaping Substances     Social History     Tobacco Use    Smoking status: Never   Vaping Use    Vaping status: Never Used   Substance Use Topics    Alcohol use: Not Currently    Drug use: Not Currently       Review of Systems   Constitutional:  Negative for appetite change, chills, diaphoresis, fever and unexpected weight change.   HENT:  Negative for congestion and rhinorrhea.    Eyes:  Negative for photophobia and visual disturbance.   Respiratory:  Negative for cough, chest tightness and shortness of breath.    Cardiovascular:  Negative for chest pain, palpitations and leg swelling.   Gastrointestinal:  Negative for abdominal distention, abdominal pain, blood in stool, constipation, diarrhea, nausea and vomiting.   Genitourinary:  Negative for dysuria and hematuria.   Musculoskeletal:  Positive for back pain and myalgias. Negative for joint swelling, neck pain and neck stiffness.   Skin:  Negative for color change, pallor, rash and wound.   Neurological:  Positive for dizziness and light-headedness. Negative for syncope, weakness and headaches.   Psychiatric/Behavioral:  Negative for agitation.    All other systems reviewed and are negative.      Physical Exam  Physical Exam  Vitals and nursing note reviewed.   Constitutional:       General: She is not in acute distress.     Appearance: Normal appearance. She is well-developed. She is not ill-appearing, toxic-appearing or diaphoretic.   HENT:      Head: Normocephalic and atraumatic.      Right Ear: Tympanic membrane, ear canal and external ear normal. There is no impacted cerumen.      Left Ear: Tympanic membrane, ear canal and external ear normal. There is no impacted cerumen.      Nose: Nose normal. No  congestion or rhinorrhea.      Mouth/Throat:      Mouth: Mucous membranes are moist.      Pharynx: Oropharynx is clear. No oropharyngeal exudate or posterior oropharyngeal erythema.   Eyes:      General: No scleral icterus.        Right eye: No discharge.         Left eye: No discharge.      Extraocular Movements: Extraocular movements intact.      Conjunctiva/sclera: Conjunctivae normal.      Pupils: Pupils are equal, round, and reactive to light.   Neck:      Vascular: No JVD.      Trachea: No tracheal deviation.      Comments: Supple. Normal range of motion.   Cardiovascular:      Rate and Rhythm: Normal rate and regular rhythm.      Heart sounds: Normal heart sounds. No murmur heard.     No friction rub. No gallop.      Comments: Normal rate and regular rhythm  Pulmonary:      Effort: Pulmonary effort is normal. No respiratory distress.      Breath sounds: Normal breath sounds. No stridor. No wheezing or rales.      Comments: Clear to auscultation bilaterally  Chest:      Chest wall: No tenderness.   Abdominal:      General: Bowel sounds are normal. There is no distension.      Palpations: Abdomen is soft.      Tenderness: There is no abdominal tenderness. There is no right CVA tenderness, left CVA tenderness, guarding or rebound.      Comments: Soft, nontender, nondistended.  Normal bowel sounds throughout   Musculoskeletal:         General: No swelling, tenderness, deformity or signs of injury. Normal range of motion.      Cervical back: Normal range of motion and neck supple. No rigidity. No muscular tenderness.      Right lower leg: No edema.      Left lower leg: No edema.      Comments: No back or calf tenderness   Lymphadenopathy:      Cervical: No cervical adenopathy.   Skin:     General: Skin is warm and dry.      Coloration: Skin is not pale.      Findings: No erythema or rash.   Neurological:      General: No focal deficit present.      Mental Status: She is alert. Mental status is at baseline.       Sensory: No sensory deficit.      Motor: No weakness or abnormal muscle tone.      Coordination: Coordination normal.      Gait: Gait normal.      Comments: Alert.  Strength and sensation grossly intact.  Ambulatory without difficulty at baseline.    Psychiatric:         Behavior: Behavior normal.         Thought Content: Thought content normal.         Vital Signs  ED Triage Vitals [08/21/24 1301]   Temperature Pulse Respirations Blood Pressure SpO2   98.6 °F (37 °C) 72 18 154/92 100 %      Temp Source Heart Rate Source Patient Position - Orthostatic VS BP Location FiO2 (%)   Oral Monitor Sitting Right arm --      Pain Score       8           Vitals:    08/21/24 1301 08/21/24 1330   BP: 154/92 130/70   Pulse: 72 75   Patient Position - Orthostatic VS: Sitting Lying         Visual Acuity  Visual Acuity      Flowsheet Row Most Recent Value   L Pupil Size (mm) 3   R Pupil Size (mm) 3            ED Medications  Medications   acetaminophen (TYLENOL) tablet 975 mg (975 mg Oral Given 8/21/24 1332)   ketorolac (TORADOL) injection 15 mg (15 mg Intravenous Given 8/21/24 1342)   metoclopramide (REGLAN) injection 10 mg (10 mg Intravenous Given 8/21/24 1342)   meclizine (ANTIVERT) tablet 25 mg (25 mg Oral Given 8/21/24 1333)   sodium chloride 0.9 % bolus 1,000 mL (0 mL Intravenous Stopped 8/21/24 1512)       Diagnostic Studies  Results Reviewed       Procedure Component Value Units Date/Time    HS Troponin 0hr (reflex protocol) [[id number]]  (Normal) Collected: 08/21/24 1338    Lab Status: Final result Specimen: Blood from Arm, Right Updated: 08/21/24 1410     hs TnI 0hr <2 ng/L     D-Dimer [[id number]]  (Normal) Collected: 08/21/24 1338    Lab Status: Final result Specimen: Blood from Arm, Right Updated: 08/21/24 1403     D-Dimer, Quant <0.27 ug/ml FEU     Basic metabolic panel [[id number]] Collected: 08/21/24 1338    Lab Status: Final result Specimen: Blood from Arm, Right Updated: 08/21/24 1402     Sodium 137 mmol/L       Potassium 3.6 mmol/L      Chloride 103 mmol/L      CO2 28 mmol/L      ANION GAP 6 mmol/L      BUN 8 mg/dL      Creatinine 0.62 mg/dL      Glucose 94 mg/dL      Calcium 9.2 mg/dL      eGFR 118 ml/min/1.73sq m     Narrative:      [employer] Kid[employer] guidelines for Chronic Kidney Disease (CKD):     Stage 1 with normal or high GFR (GFR > 90 mL/min/1.73 square meters)    Stage 2 Mild CKD (GFR = 60-89 mL/min/1.73 square meters)    Stage 3A Moderate CKD (GFR = 45-59 mL/min/1.73 square meters)    Stage 3B Moderate CKD (GFR = 30-44 mL/min/1.73 square meters)    Stage 4 Severe CKD (GFR = 15-29 mL/min/1.73 square meters)    Stage 5 End Stage CKD (GFR <15 mL/min/1.73 square meters)  Note: GFR calculation is accurate only with a steady state creatinine    CBC and differential [[id number]] Collected: 08/21/24 1338    Lab Status: Final result Specimen: Blood from Arm, Right Updated: 08/21/24 1348     WBC 7.48 Thousand/uL      RBC 4.10 Million/uL      Hemoglobin 12.3 g/dL      Hematocrit 35.6 %      MCV 87 fL      MCH 30.0 pg      MCHC 34.6 g/dL      RDW 12.6 %      MPV 9.4 fL      Platelets 287 Thousands/uL      nRBC 0 /100 WBCs      Segmented % 67 %      Immature Grans % 0 %      Lymphocytes % 24 %      Monocytes % 7 %      Eosinophils Relative 2 %      Basophils Relative 0 %      Absolute Neutrophils 5.06 Thousands/µL      Absolute Immature Grans 0.02 Thousand/uL      Absolute Lymphocytes 1.76 Thousands/µL      Absolute Monocytes 0.49 Thousand/µL      Eosinophils Absolute 0.13 Thousand/µL      Basophils Absolute 0.02 Thousands/µL                    XR chest 2 views   Final Result by Damon Ibarra MD (08/21 1520)      No acute cardiopulmonary disease.            Workstation performed: [id number]                    Procedures  Procedures         ED Course               HEART Risk Score      Flowsheet Row Most Recent Value   Heart Score Risk Calculator    History 0 Filed at: 08/21/2024 1500   ECG 0 Filed at: 08/21/2024  1500   Age 0 Filed at: 08/21/2024 1500   Risk Factors 0 Filed at: 08/21/2024 1500   Troponin 0 Filed at: 08/21/2024 1500   HEART Score 0 Filed at: 08/21/2024 1500                          SBIRT 20yo+      Flowsheet Row Most Recent Value   Initial Alcohol Screen:  AUDIT-C     1. How often do you have a drink containing alcohol? 0 Filed at: 08/21/2024 1512   2. How many drinks containing alcohol do you have on a typical day you are drinking?  0 Filed at: 08/21/2024 1512   3b. FEMALE Any Age, or MALE 65+: How often do you have 4 or more drinks on one occassion? 0 Filed at: 08/21/2024 1512   Audit-C Score 0 Filed at: 08/21/2024 1512   LISSET: How many times in the past year have you...    Used an illegal drug or used a prescription medication for non-medical reasons? Never Filed at: 08/21/2024 1512                      Medical Decision Making  34-year-old female no significant reported past history presenting with multiple complaints.  Plan for cardiac evaluation including EKG troponin.  Basic labs.  Plan to also check D-dimer.  Symptom management with oral and IV medications plus fluids.  Reassess.    EKG interpreted by me with normal sinus rhythm with no acute ST abnormality.  Labs interpreted by me without significant acute process.  Symptoms significantly improved after medications.  Work note.  Prescription sent to pharmacy. Discussed results and recommendations. Advised follow up PCP. Medication recommendations. Given instructions and return precautions. Patient/family at bedside acknowledged understanding of all written and verbal instructions and return precautions. Discharged.     Amount and/or Complexity of Data Reviewed  Labs: ordered.  Radiology: ordered.    Risk  OTC drugs.  Prescription drug management.                 Disposition  Final diagnoses:   Dizziness   Acute left-sided thoracic back pain   Lightheadedness   Pain of right calf     Time reflects when diagnosis was documented in both MDM as  applicable and the Disposition within this note       Time User Action Codes Description Comment    8/21/2024  1:25 PM Erne Rk Add [R42] Dizziness     8/21/2024  1:25 PM Erne Rk Add [M54.6] Acute left-sided thoracic back pain     8/21/2024  1:25 PM Erne Rk Add [R42] Lightheadedness     8/21/2024  1:26 PM Erne Rk Add [M79.661] Pain of right calf           ED Disposition       ED Disposition   Discharge    Condition   Stable    Date/Time   Wed Aug 21, 2024 1432    Comment   Juan Brown discharge to home/self care.                   Follow-up Information       Follow up With Specialties Details Why Contact Info    [facility] Schedule an appointment as soon as possible for a visit in 1 week  [address] [place] [id number]  [phone]              Discharge Medication List as of 8/21/2024  2:32 PM        START taking these medications    Details   meclizine (ANTIVERT) 25 mg tablet Take 1 tablet (25 mg total) by mouth 3 (three) times a day as needed for dizziness, Starting Wed 8/21/2024, Normal           CONTINUE these medications which have NOT CHANGED    Details   acetaminophen (TYLENOL) 325 mg tablet Take 2 tablets (650 mg total) by mouth every 6 (six) hours as needed for mild pain, Starting Tue 4/9/2024, Normal      ibuprofen (MOTRIN) 600 mg tablet Take 1 tablet (600 mg total) by mouth every 8 (eight) hours as needed for moderate pain, Starting Tue 4/9/2024, Normal      lidocaine (Lidoderm) 5 % Apply 1 patch topically over 12 hours daily Remove & Discard patch within 12 hours or as directed by MD, Starting Thu 4/6/2023, Normal      methylPREDNISolone 4 MG tablet therapy pack Use as directed on package, Normal      oxyCODONE (Roxicodone) 5 immediate release tablet Take 1 tablet (5 mg total) by mouth every 6 (six) hours as needed for moderate pain Max Daily Amount: 20 mg, Starting Tue 3/26/2024, Normal             No discharge procedures on file.    PDMP Review          Value Time User    PDMP Reviewed  Yes 3/26/2024  9:17 AM Willian Soliz PA-C            ED Provider  Electronically Signed by             Rk Malone MD  08/21/24 [id number]

## 2024-10-17 ENCOUNTER — OFFICE VISIT (OUTPATIENT)
Dept: OBGYN CLINIC | Facility: CLINIC | Age: 34
End: 2024-10-17
Payer: COMMERCIAL

## 2024-10-17 VITALS
HEIGHT: 61 IN | WEIGHT: 191 LBS | SYSTOLIC BLOOD PRESSURE: 116 MMHG | HEART RATE: 78 BPM | DIASTOLIC BLOOD PRESSURE: 77 MMHG | BODY MASS INDEX: 36.06 KG/M2

## 2024-10-17 DIAGNOSIS — M22.2X2 PATELLOFEMORAL DISORDER OF LEFT KNEE: Primary | ICD-10-CM

## 2024-10-17 DIAGNOSIS — S82.832A CLOSED FRACTURE OF PROXIMAL END OF LEFT FIBULA, UNSPECIFIED FRACTURE MORPHOLOGY, INITIAL ENCOUNTER: ICD-10-CM

## 2024-10-17 PROCEDURE — 99213 OFFICE O/P EST LOW 20 MIN: CPT | Performed by: ORTHOPAEDIC SURGERY

## 2024-10-17 RX ORDER — IBUPROFEN 600 MG/1
600 TABLET, FILM COATED ORAL EVERY 8 HOURS PRN
Qty: 40 TABLET | Refills: 1 | Status: SHIPPED | OUTPATIENT
Start: 2024-10-17

## 2024-10-17 NOTE — PROGRESS NOTES
Orthopaedics Office Visit - Follow-Up Patient Visit    ASSESSMENT/PLAN:    Assessment:   7 months s/p left comminuted fibular fracture, DOI: 3/21/2024   Bony union  Patellofemoral pain    Plan:   Overall the patient is doing well today  Continue home exercise plan focused on quadriceps strengthening  Refill of ibuprofen 600 mg provided  Continue activity as tolerated, no specific restrictions  She will follow up as needed    To Do Next Visit:  Re-evaluation    _____________________________________________________  CHIEF COMPLAINT:  Chief Complaint   Patient presents with    Left Knee - Follow-up         SUBJECTIVE:  Juan Brown is a 34 y.o. female who presents for follow-up evaluation of her left lower leg. The patient is approximately 7 months s/p proximal fibular fracture. Today the patient reports she is doing well overall. She has been back to work, but has aches at the end of the day. She has restless leg syndrome at nighttime.     PAST MEDICAL HISTORY:  History reviewed. No pertinent past medical history.    PAST SURGICAL HISTORY:  Past Surgical History:   Procedure Laterality Date     SECTION         FAMILY HISTORY:  History reviewed. No pertinent family history.    SOCIAL HISTORY:  Social History     Tobacco Use    Smoking status: Never   Vaping Use    Vaping status: Never Used   Substance Use Topics    Alcohol use: Not Currently    Drug use: Not Currently       MEDICATIONS:    Current Outpatient Medications:     acetaminophen (TYLENOL) 325 mg tablet, Take 2 tablets (650 mg total) by mouth every 6 (six) hours as needed for mild pain, Disp: 40 tablet, Rfl: 0    ibuprofen (MOTRIN) 600 mg tablet, Take 1 tablet (600 mg total) by mouth every 8 (eight) hours as needed for moderate pain, Disp: 20 tablet, Rfl: 0    meclizine (ANTIVERT) 25 mg tablet, Take 1 tablet (25 mg total) by mouth 3 (three) times a day as needed for dizziness, Disp: 20 tablet, Rfl: 0    methylPREDNISolone 4 MG tablet therapy pack, Use  "as directed on package, Disp: 1 each, Rfl: 0    oxyCODONE (Roxicodone) 5 immediate release tablet, Take 1 tablet (5 mg total) by mouth every 6 (six) hours as needed for moderate pain Max Daily Amount: 20 mg, Disp: 20 tablet, Rfl: 0    lidocaine (Lidoderm) 5 %, Apply 1 patch topically over 12 hours daily Remove & Discard patch within 12 hours or as directed by MD, Disp: 5 patch, Rfl: 0    ALLERGIES:  No Known Allergies    REVIEW OF SYSTEMS:  MSK: As noted in HPI.  Neuro: WNL.  Pertinent items are otherwise noted in HPI.  A comprehensive review of systems was otherwise negative.    LABS:  HgA1c: No results found for: \"HGBA1C\"  BMP:   Lab Results   Component Value Date    CALCIUM 9.2 08/21/2024    K 3.6 08/21/2024    CO2 28 08/21/2024     08/21/2024    BUN 8 08/21/2024    CREATININE 0.62 08/21/2024     CBC: No components found for: \"CBC\"    _____________________________________________________  PHYSICAL EXAMINATION:  Vital signs: /77   Pulse 78   Ht 5' 1\" (1.549 m)   Wt 86.6 kg (191 lb)   BMI 36.09 kg/m²   General: No acute distress, awake and alert  Psychiatric: Mood and affect appear appropriate  HEENT: Trachea Midline, No torticollis, no apparent facial trauma  Cardiovascular: No audible murmurs; Extremities appear perfused  Pulmonary: No audible wheezing or stridor  Skin: No open lesions; see further details (if any) below    MUSCULOSKELETAL EXAMINATION:  Extremities:    left lower leg(s) -   Patient ambulates with steady gait pattern  No assistive device for ambulation  No anatomical deformity  Skin is warm and dry to touch with no signs of erythema, ecchymosis, or infection   No soft tissue swelling or effusion noted  Knee ROM (0° - 120°)   Ankle ROM DF 10°, PF 50°, Inversion 30°, Eversion 5°  Strength: 5/5 throughout  No tenderness over the fibular head   Knee is stable to varus and valgus stress  Calf compartments are soft and supple  2+ DP and PT pulses with brisk capillary refill to the " toes  Sural, saphenous, tibial, superficial, and deep peroneal motor and sensory distributions intact  Sensation light touch intact distally      _____________________________________________________  STUDIES REVIEWED:  I personally reviewed the images and interpretation is as follows:    No new images today      PROCEDURES PERFORMED:  Procedures  None performed.       Scribe Attestation      I,:  Irene Cramer am acting as a scribe while in the presence of the attending physician.:       I,:  Campbell Lieberman MD personally performed the services described in this documentation    as scribed in my presence.: